# Patient Record
Sex: MALE | NOT HISPANIC OR LATINO | Employment: FULL TIME | URBAN - METROPOLITAN AREA
[De-identification: names, ages, dates, MRNs, and addresses within clinical notes are randomized per-mention and may not be internally consistent; named-entity substitution may affect disease eponyms.]

---

## 2024-05-17 ENCOUNTER — TELEPHONE (OUTPATIENT)
Dept: PAIN MEDICINE | Facility: CLINIC | Age: 69
End: 2024-05-17
Payer: COMMERCIAL

## 2024-05-17 NOTE — TELEPHONE ENCOUNTER
----- Message from Cornelia Jamil RN sent at 5/17/2024  3:01 PM CDT -----  Contact: Dannie    ----- Message -----  From: Judy Randolph  Sent: 5/7/2024   4:03 PM CDT  To: [facility] S[facility] Staff Pain Mgmt    Pt is calling in regards to est care and also referral from [facility] was sent over. Dannie can be reached at [phone].        Thanks  [facility]

## 2024-05-17 NOTE — TELEPHONE ENCOUNTER
Spoke with wife, referral and demographic from [facility] pt received. Pt is scheduled to have MRI soon, wife will obtain disc and imaging report for Dr Call to review prior to scheduling.

## 2024-07-02 ENCOUNTER — TELEPHONE (OUTPATIENT)
Dept: PAIN MEDICINE | Facility: CLINIC | Age: 69
End: 2024-07-02
Payer: COMMERCIAL

## 2024-07-02 NOTE — TELEPHONE ENCOUNTER
----- Message from Judy Randolph sent at 7/2/2024 11:15 AM CDT -----  Pt wife nAia is calling in regards to getting ECA and if referral is needed. Ania an be reached at [phone].          Thanks  DARIUS

## 2024-07-02 NOTE — TELEPHONE ENCOUNTER
I spoke with Ania (Mr Bowman wife) to let her know we still haven't received a referral advised to contact PCP to request that they resend referral to our office, understanding verbalized.

## 2024-07-16 DIAGNOSIS — M25.551 RIGHT HIP PAIN: Primary | ICD-10-CM

## 2024-07-19 ENCOUNTER — OFFICE VISIT (OUTPATIENT)
Dept: PAIN MEDICINE | Facility: CLINIC | Age: 69
End: 2024-07-19
Payer: COMMERCIAL

## 2024-07-19 VITALS
BODY MASS INDEX: 27.83 KG/M2 | HEIGHT: 66 IN | SYSTOLIC BLOOD PRESSURE: 144 MMHG | WEIGHT: 173.19 LBS | HEART RATE: 61 BPM | OXYGEN SATURATION: 95 % | DIASTOLIC BLOOD PRESSURE: 81 MMHG

## 2024-07-19 DIAGNOSIS — R29.2 BRISK DEEP TENDON REFLEXES: ICD-10-CM

## 2024-07-19 DIAGNOSIS — M43.12 SPONDYLOLISTHESIS OF CERVICAL REGION: ICD-10-CM

## 2024-07-19 DIAGNOSIS — M54.2 CERVICALGIA: ICD-10-CM

## 2024-07-19 DIAGNOSIS — M70.61 GREATER TROCHANTERIC BURSITIS OF RIGHT HIP: ICD-10-CM

## 2024-07-19 DIAGNOSIS — F41.8 SITUATIONAL ANXIETY: ICD-10-CM

## 2024-07-19 DIAGNOSIS — R29.2: ICD-10-CM

## 2024-07-19 DIAGNOSIS — M25.551 RIGHT HIP PAIN: Primary | ICD-10-CM

## 2024-07-19 DIAGNOSIS — M47.812 CERVICAL SPONDYLOSIS: ICD-10-CM

## 2024-07-19 DIAGNOSIS — M25.551 PAIN OF RIGHT HIP: ICD-10-CM

## 2024-07-19 DIAGNOSIS — M48.02 SPINAL STENOSIS, CERVICAL REGION: ICD-10-CM

## 2024-07-19 DIAGNOSIS — F10.21 HISTORY OF ALCOHOLISM: ICD-10-CM

## 2024-07-19 PROCEDURE — 20611 DRAIN/INJ JOINT/BURSA W/US: CPT | Mod: RT,S$GLB,, | Performed by: PHYSICAL MEDICINE & REHABILITATION

## 2024-07-19 PROCEDURE — 4010F ACE/ARB THERAPY RXD/TAKEN: CPT | Mod: CPTII,S$GLB,, | Performed by: PHYSICAL MEDICINE & REHABILITATION

## 2024-07-19 PROCEDURE — 1159F MED LIST DOCD IN RCRD: CPT | Mod: CPTII,S$GLB,, | Performed by: PHYSICAL MEDICINE & REHABILITATION

## 2024-07-19 PROCEDURE — 99204 OFFICE O/P NEW MOD 45 MIN: CPT | Mod: 25,S$GLB,, | Performed by: PHYSICAL MEDICINE & REHABILITATION

## 2024-07-19 PROCEDURE — 3008F BODY MASS INDEX DOCD: CPT | Mod: CPTII,S$GLB,, | Performed by: PHYSICAL MEDICINE & REHABILITATION

## 2024-07-19 PROCEDURE — 3079F DIAST BP 80-89 MM HG: CPT | Mod: CPTII,S$GLB,, | Performed by: PHYSICAL MEDICINE & REHABILITATION

## 2024-07-19 PROCEDURE — 3077F SYST BP >= 140 MM HG: CPT | Mod: CPTII,S$GLB,, | Performed by: PHYSICAL MEDICINE & REHABILITATION

## 2024-07-19 PROCEDURE — 1160F RVW MEDS BY RX/DR IN RCRD: CPT | Mod: CPTII,S$GLB,, | Performed by: PHYSICAL MEDICINE & REHABILITATION

## 2024-07-19 PROCEDURE — 1125F AMNT PAIN NOTED PAIN PRSNT: CPT | Mod: CPTII,S$GLB,, | Performed by: PHYSICAL MEDICINE & REHABILITATION

## 2024-07-19 RX ORDER — SODIUM, POTASSIUM,MAG SULFATES 17.5-3.13G
SOLUTION, RECONSTITUTED, ORAL ORAL
COMMUNITY
Start: 2024-07-09

## 2024-07-19 RX ORDER — NEBIVOLOL 10 MG/1
10 TABLET ORAL
COMMUNITY
Start: 2024-05-18

## 2024-07-19 RX ORDER — IRBESARTAN 300 MG/1
300 TABLET ORAL
COMMUNITY
Start: 2024-06-07

## 2024-07-19 RX ORDER — TADALAFIL 20 MG/1
TABLET ORAL
COMMUNITY
Start: 2024-07-10

## 2024-07-19 RX ORDER — TAMSULOSIN HYDROCHLORIDE 0.4 MG/1
0.4 CAPSULE ORAL
COMMUNITY
Start: 2024-04-22

## 2024-07-19 RX ORDER — TRIAMCINOLONE ACETONIDE 40 MG/ML
40 INJECTION, SUSPENSION INTRA-ARTICULAR; INTRAMUSCULAR
Status: DISCONTINUED | OUTPATIENT
Start: 2024-07-19 | End: 2024-07-19 | Stop reason: HOSPADM

## 2024-07-19 RX ORDER — FINASTERIDE 5 MG/1
5 TABLET, FILM COATED ORAL
COMMUNITY
Start: 2024-06-07

## 2024-07-19 RX ORDER — HYDROCHLOROTHIAZIDE 12.5 MG/1
12.5 TABLET ORAL
COMMUNITY
Start: 2024-05-21

## 2024-07-19 RX ORDER — LIDOCAINE HYDROCHLORIDE 10 MG/ML
3 INJECTION INFILTRATION; PERINEURAL
Status: DISCONTINUED | OUTPATIENT
Start: 2024-07-19 | End: 2024-07-19 | Stop reason: HOSPADM

## 2024-07-19 RX ORDER — ROSUVASTATIN CALCIUM 10 MG/1
10 TABLET, COATED ORAL
COMMUNITY
Start: 2024-06-07

## 2024-07-19 RX ADMIN — LIDOCAINE HYDROCHLORIDE 3 ML: 10 INJECTION INFILTRATION; PERINEURAL at 01:07

## 2024-07-19 RX ADMIN — TRIAMCINOLONE ACETONIDE 40 MG: 40 INJECTION, SUSPENSION INTRA-ARTICULAR; INTRAMUSCULAR at 01:07

## 2024-07-19 NOTE — PROCEDURES
Large Joint Aspiration/Injection: R greater trochanteric bursa    Date/Time: 7/19/2024 1:00 PM    Performed by: Daphne Call MD  Authorized by: Daphne Call MD    Consent Done?:  Yes (Written)  Indications:  Pain  Site marked: the procedure site was marked    Timeout: prior to procedure the correct patient, procedure, and site was verified    Prep: patient was prepped and draped in usual sterile fashion    Local anesthesia used?: No      Details:  Needle Size:  22 G  Ultrasonic Guidance for needle placement?: Yes    Images are saved and documented.  Approach:  Lateral  Location:  Hip  Site:  R greater trochanteric bursa  Medications:  40 mg triamcinolone acetonide 40 mg/mL; 3 mL LIDOcaine HCL 10 mg/ml (1%) 10 mg/mL (1 %)  Patient tolerance:  Patient tolerated the procedure well with no immediate complications    
MST Score 2 or Greater

## 2024-07-19 NOTE — PROGRESS NOTES
[facility]      Referring Provider: Eleanor Roldan Md  [address]   [address]  [facility] [place] [address]    Chief Complaint:   Chief Complaint   Patient presents with    Hip Pain     right       History of Present Illness: Dannie Bowman is a 68 y.o. male referred by Dr. Eleanor Roldan for right hip pain.      Onset: 1 year, insidious  Location: right lateral hip  Radiation: right lateral thigh to the knee  Timing: intermittent  Quality: Aching, Tight, Deep, and Sharp  Exacerbating Factors: walking, standing, and lifting  Alleviating Factors: lying down, sitting, repositioning , and medications  Associated Symptoms: He feels weakness in the right leg. He gets occasional numbness in the leg if sitting for a long time. He has a history of skin cancer. Pain will wake him at night. He denies night fever/night sweats, urinary incontinence/change in function, bowel incontinence/change in function, and unexplained weight loss    Patient has failed > 6 weeks of conservative treatment including: Activity modification (avoiding exacerbating factors), physical therapy, and oral medications. He is using a quad cane. He has tried ibuprofen. He has tried CBD oil.     Severity: Currently: 3/10   Typical Range: 2-8/10     Exacerbation: 8/10     Functional Limitations: Moderate to severe pain is limiting Sleep, Work, and Recreation.    Employment Status: Professor of Math at [employer]    P = 4  E = 3  G = 5  Baseline PEG Score = 4  Current PEG Score: 4    Opioid Risk Score         Value Time User    Opioid Risk Score  0 7/19/2024  1:15 PM Zena Chapa LPN             Previous Interventions:  -     Previous Therapies:  PT/OT: yes   Relevant Surgery: no   Previous Medications:   - NSAIDS: ibuprofen  - Muscle Relaxants:    - TCAs:   - SNRIs:   - Topicals: CBD oil  - Anticonvulsants:    - Opioids:     Current Pain Medications:  Ibuprofen     Blood Thinners: None    Full Medication  "List:    Current Outpatient Medications:     finasteride (PROSCAR) 5 mg tablet, 5 mg., Disp: , Rfl:     hydroCHLOROthiazide (HYDRODIURIL) 12.5 MG Tab, Take 12.5 mg by mouth., Disp: , Rfl:     irbesartan (AVAPRO) 300 MG tablet, Take 300 mg by mouth., Disp: , Rfl:     nebivoloL (BYSTOLIC) 10 MG Tab, 10 mg., Disp: , Rfl:     rosuvastatin (CRESTOR) 10 MG tablet, Take 10 mg by mouth., Disp: , Rfl:     sodium,potassium,mag sulfates (SUPREP BOWEL PREP KIT) 17.5-3.13-1.6 gram SolR, TAKE 6 ML BY MOUTH AS DIRECTED FOLLOW MD INSTRUCTIONS, Disp: , Rfl:     tadalafiL (CIALIS) 20 MG Tab, TAKE 1 TABLET BY MOUTH AS DIRECTED (NOT COVERED), Disp: , Rfl:     tamsulosin (FLOMAX) 0.4 mg Cap, Take 0.4 mg by mouth., Disp: , Rfl:      Review of Systems: See HPI    Allergies:  Patient has no known allergies.     Medical History:   has a past medical history of Hyperlipidemia, Hypertension, Impotence of organic origin, Joint pain, and Unspecified sensorineural hearing loss.    Surgical History:   has a past surgical history that includes Hernia repair (Bilateral) and Bone graft (Right).    Family History:  family history includes Cancer in his father and mother; Diabetes in his father and mother; Hypertension in his father and mother.    Social History:   reports that he has quit smoking. His smoking use included cigarettes. He has never used smokeless tobacco. He reports that he does not drink alcohol and does not use drugs.    Physical Exam:  BP (!) 144/81   Pulse 61   Ht 5' 5.98" (1.676 m)   Wt 78.6 kg (173 lb 3.1 oz)   SpO2 95%   BMI 27.97 kg/m²   GEN: No acute distress. Calm, comfortable  HENT: Normocephalic, atraumatic, moist mucous membranes  EYE: Anicteric sclera, non-injected.   CV: Non-diaphoretic. Regular Rate. Radial Pulses 2+.  RESP: Breathing comfortably. Chest expansion symmetric.  EXT: No clubbing, cyanosis. Peripheral edema in legs/feet  SKIN: Warm, & dry to palpation. No visible rashes or lesions of exposed skin. "   PSYCH: Pleasant mood and appropriate affect. Recent and remote memory intact.   GAIT: Mod Independent w/ quad cane, antalgic ambulation not bearing weight on the right  Lumbar Spine Exam:       Inspection: No erythema, bruising.       Palpation:   - (-) TTP of lumbar paraspinals bilaterally.  - (-) TTP of sacroiliac joint bilaterally.     ROM: (+) Limited in flexion. (-) limited in extension, (-) limitation in lateral bending bilateral.    Directional Preference: None      Provocative Maneuvers:  (-) Facet loading bilaterally  (+) Straight Leg Raise on the right   (+) SHANTE on the right w/ pain on lateral hip primarily  Hip Exam:      Inspection: No gross deformity or apparent leg length discrepancy      Palpation: (+) TTP to greater trochanteric bursa(s) on right.       ROM: (slight) limitation in internal rotation b/l, limitation in external rotation bilateral  Neurologic Exam:     Alert. Speech is fluent and appropriate.     Strength:  4/5 in b/l hip flexion, otherwise 5/5 throughout bilateral lower extremities     Sensation:  Grossly intact to light touch in bilateral lower extremities     Reflexes: 3+ in b/l patella, achilles     Tone: No abnormality appreciated in bilateral lower extremities     No Clonus     Downgoing toes on plantar stimulation     (+) Andrade bilaterally                Imaging:  - X-ray cervical spine 7/19/24:  Vertebral body heights maintained. Trace retrolisthesis C4 on C5. Multilevel degenerative disc height loss of the mid and lower cervical spine with osteophyte formation most prevalent at C3-4 and C4-5. Multilevel facet arthropathy present. No acute abnormality. Soft tissues unremarkable.     - X-ray lumbar spine 7/19/24:  Vertebral body heights maintained. No spondylolisthesis. L5-S1 degenerative disc height loss. Multilevel facet arthropathy. No acute osseous abnormality. Constipation.     - X-ray hips b/l 7/19/24:  No acute osseous abnormality. Hip joint spaces maintained.  "Degenerative findings of the lower lumbar spine noted. Soft tissues unremarkable.         Labs:  BMP  No results found for: "NA", "K", "CL", "CO2", "BUN", "CREATININE", "CALCIUM", "ANIONGAP", "EGFRNORACEVR"  No results found for: "ALT", "AST", "GGT", "ALKPHOS", "BILITOT"  No results found for: "PLT"    Assessment:  Dannie Bowman is a 68 y.o. male with the following diagnoses based on history, exam, and imaging:    Problem List Items Addressed This Visit    None  Visit Diagnoses       Right hip pain    -  Primary    Relevant Orders    X-Ray Hips Bilateral 2 View Incl AP Pelvis (Completed)    MRI Hip Without Contrast Right    X-Ray Lumbar Spine AP And Lateral (Completed)    Large Joint Aspiration/Injection: R greater trochanteric bursa    Greater trochanteric bursitis of right hip        Relevant Orders    X-Ray Hips Bilateral 2 View Incl AP Pelvis (Completed)    Large Joint Aspiration/Injection: R greater trochanteric bursa    Cervicalgia        Relevant Orders    MRI Cervical Spine Without Contrast    X-Ray Cervical Spine AP And Lateral (Completed)    Spinal stenosis, cervical region        Relevant Orders    MRI Cervical Spine Without Contrast    X-Ray Cervical Spine AP And Lateral (Completed)    Andrade's reflex positive        Relevant Orders    MRI Cervical Spine Without Contrast    X-Ray Cervical Spine AP And Lateral (Completed)    Brisk deep tendon reflexes        Relevant Orders    MRI Cervical Spine Without Contrast    X-Ray Cervical Spine AP And Lateral (Completed)    Situational anxiety        History of alcoholism        Relevant Orders    X-Ray Hips Bilateral 2 View Incl AP Pelvis (Completed)    MRI Hip Without Contrast Right    Pain of right hip        Relevant Orders    MRI Hip Without Contrast Right    Cervical spondylosis        Spondylolisthesis of cervical region                This is a pleasant 68 y.o. gentleman presenting with:     - Chronic right hip pain radiating down to the right knee: " Suspect primarily due to GTBursitis and having some ITB syndrome with weakness in pelvis/hips   - Minimal findings on hip x-ray, outside of degenerative changes of spine.   - He does have h/o heavy EtOH, and potentially this is AVN of the hip  - Patient does have radiating pain down the right leg and (+) SLR on the right indicating possible radiculopathy  - Neck pain: (+) bridges, brisk reflexes on exam.   - Comorbidities: HTN. H/o     Treatment Plan:   - PT/OT/HEP: Cont HEP. Discussed benefits of exercise for pain.   - Procedures: Performed right GTB CSI w/ US guidance   - If limited relief, plan for likely lumbar TF JOEL pending advanced imaging.   - Medications: No changes recommended at this time.  - Imaging: Reviewed.   - X-ray hips and c-spine.   - MRI right hip as failed conservative measures and h/o heavy EtOH but minimal findings on x-ray and would like to r/o AVN of the hip  - MRI C-spine to assess neck pain and UMN signs on exam.   - Plan for lumbar MRI if hip MRI not revealing.   - Labs: None.      Follow Up: RTC after imaging.       Daphne Call MD  Interventional Pain Medicine

## 2024-07-30 ENCOUNTER — TELEPHONE (OUTPATIENT)
Dept: PAIN MEDICINE | Facility: CLINIC | Age: 69
End: 2024-07-30
Payer: COMMERCIAL

## 2024-07-30 DIAGNOSIS — F41.8 SITUATIONAL ANXIETY: Primary | ICD-10-CM

## 2024-07-30 RX ORDER — DIAZEPAM 5 MG/1
5 TABLET ORAL ONCE
Qty: 2 TABLET | Refills: 0 | Status: SHIPPED | OUTPATIENT
Start: 2024-07-30 | End: 2024-07-30

## 2024-07-30 NOTE — TELEPHONE ENCOUNTER
----- Message from Augusta Morales sent at 7/30/2024 12:38 PM CDT -----  Contact: pt wife Ania  Pt wife Ania calling to state pt is scheduled for  mri for 8/12 and she can be reached at. [phone].    Thanks,

## 2024-07-30 NOTE — TELEPHONE ENCOUNTER
Spoke with pt, he is in the process of scheduling the MRI's that Dr Call ordered.     Pt would like to have the valium sent to [facility] on [address] due to claustrophobia, to take prior to MRI. Pt states that he spoke with Dr Call about this at last visit.

## 2024-08-12 DIAGNOSIS — M54.16 LUMBAR RADICULOPATHY, CHRONIC: Primary | ICD-10-CM

## 2024-08-12 DIAGNOSIS — F41.8 SITUATIONAL ANXIETY: ICD-10-CM

## 2024-08-13 DIAGNOSIS — R29.2: ICD-10-CM

## 2024-08-13 DIAGNOSIS — R29.2 BRISK DEEP TENDON REFLEXES: ICD-10-CM

## 2024-08-13 DIAGNOSIS — M48.02 DEGENERATIVE CERVICAL SPINAL STENOSIS: Primary | ICD-10-CM

## 2024-08-13 NOTE — PROGRESS NOTES
Imaging reviewed.  The hips looks okay and no signs of avascular necrosis. Will order lumbar MRI to assess radiculopathy, leg weakness.    Please pull images from [facility] into Epic.

## 2024-08-14 ENCOUNTER — TELEPHONE (OUTPATIENT)
Dept: PAIN MEDICINE | Facility: CLINIC | Age: 69
End: 2024-08-14

## 2024-08-14 ENCOUNTER — OFFICE VISIT (OUTPATIENT)
Dept: PAIN MEDICINE | Facility: CLINIC | Age: 69
End: 2024-08-14
Payer: COMMERCIAL

## 2024-08-14 VITALS
HEIGHT: 66 IN | BODY MASS INDEX: 27.85 KG/M2 | SYSTOLIC BLOOD PRESSURE: 146 MMHG | HEART RATE: 78 BPM | WEIGHT: 173.31 LBS | DIASTOLIC BLOOD PRESSURE: 51 MMHG

## 2024-08-14 DIAGNOSIS — M48.02 DEGENERATIVE CERVICAL SPINAL STENOSIS: ICD-10-CM

## 2024-08-14 DIAGNOSIS — M54.16 LUMBAR RADICULOPATHY: ICD-10-CM

## 2024-08-14 DIAGNOSIS — F41.8 SITUATIONAL ANXIETY: ICD-10-CM

## 2024-08-14 DIAGNOSIS — R29.2: ICD-10-CM

## 2024-08-14 DIAGNOSIS — M48.02 SPINAL STENOSIS, CERVICAL REGION: Primary | ICD-10-CM

## 2024-08-14 DIAGNOSIS — R29.898 WEAKNESS OF RIGHT LOWER EXTREMITY: ICD-10-CM

## 2024-08-14 DIAGNOSIS — M25.551 RIGHT HIP PAIN: ICD-10-CM

## 2024-08-14 RX ORDER — DIAZEPAM 5 MG/1
5 TABLET ORAL ONCE
Qty: 1 TABLET | Refills: 0 | Status: SHIPPED | OUTPATIENT
Start: 2024-08-14 | End: 2024-08-14

## 2024-08-14 NOTE — TELEPHONE ENCOUNTER
----- Message from Daphne Call MD sent at 8/13/2024  8:51 AM CDT -----  Imaging reviewed.  There are arthritic changes which create severe canal stenosis at multiple levels, appears worst at C5-6, and this is causing inflammation within the spinal cord.     I am sending an urgent referral to neurosurgery for potential decompression.    Please pull images from [facility] into Epic.

## 2024-08-14 NOTE — PROGRESS NOTES
[facility]          Chief Complaint:   Chief Complaint   Patient presents with    Hip Pain     right       History of Present Illness: Dannie Bowman is a 68 y.o. male referred by Dr. Eleanor Roldan for right hip pain.      Onset: 1 year, insidious  Location: right lateral hip  Radiation: right lateral thigh to the knee  Timing: intermittent  Quality: Aching, Tight, Deep, and Sharp  Exacerbating Factors: walking, standing, and lifting  Alleviating Factors: lying down, sitting, repositioning , and medications  Associated Symptoms: He feels weakness in the right leg. He gets occasional numbness in the leg if sitting for a long time. He has a history of skin cancer. Pain will wake him at night. He denies night fever/night sweats, urinary incontinence/change in function, bowel incontinence/change in function, and unexplained weight loss    Patient has failed > 6 weeks of conservative treatment including: Activity modification (avoiding exacerbating factors), physical therapy, and oral medications. He is using a quad cane. He has tried ibuprofen. He has tried CBD oil.     Severity: Currently: 3/10   Typical Range: 2-8/10     Exacerbation: 8/10     Functional Limitations: Moderate to severe pain is limiting Sleep, Work, and Recreation.    Employment Status: Professor of Math at [employer]    P = 4  E = 3  G = 5  Baseline PEG Score = 4    Interval History (08/14/2024):  Dannie Bowman returns today for follow up.  At the last clinic visit, continue HEP, Performed right GTB CSI w/ US guidance, Ordered x-ray of  hip & C-spine. Ordered MRI of right hip and C-spine given UMN signs on exam.     Right GTB CSI w/ US guidance provided 0% relief.    Currently, the hip pain is stable. Continues to have pain to lateral hip, into lateral thigh and into knee. Denies new weakness or numbness.     He did have MRI of cervical spine, which revealed severe canal stenosis, worst at C5-6. He denies neck pain, but does report dropping  items repetitively, difficulty with buttons and changes in gait.  Denies changes in bowel or bladder function. Denies recent fevers or infections. Denies unexplained weight loss. He is scheduled to see Dr. Leone tomorrow for surgical evaluation of neck..       Current Pain Scales:  Current: 4/10              Typical Range: 3-8/10     Current PEG Score: 6.33    Opioid Risk Score         Value Time User    Opioid Risk Score  8 8/14/2024  2:37 PM Stanley Gallo MA             Previous Interventions:  - 7/19/24: Right GTB w/ US guidance provided 0% relief.    Previous Therapies:  PT/OT: yes   Relevant Surgery: no   Previous Medications:   - NSAIDS: ibuprofen  - Muscle Relaxants:    - TCAs:   - SNRIs:   - Topicals: CBD oil  - Anticonvulsants:    - Opioids:     Current Pain Medications:  Ibuprofen     Blood Thinners: None    Full Medication List:    Current Outpatient Medications:     finasteride (PROSCAR) 5 mg tablet, 5 mg., Disp: , Rfl:     hydroCHLOROthiazide (HYDRODIURIL) 12.5 MG Tab, Take 12.5 mg by mouth., Disp: , Rfl:     irbesartan (AVAPRO) 300 MG tablet, Take 300 mg by mouth., Disp: , Rfl:     nebivoloL (BYSTOLIC) 10 MG Tab, 10 mg., Disp: , Rfl:     rosuvastatin (CRESTOR) 10 MG tablet, Take 10 mg by mouth., Disp: , Rfl:     tadalafiL (CIALIS) 20 MG Tab, TAKE 1 TABLET BY MOUTH AS DIRECTED (NOT COVERED), Disp: , Rfl:     tamsulosin (FLOMAX) 0.4 mg Cap, Take 0.4 mg by mouth., Disp: , Rfl:     diazePAM (VALIUM) 5 MG tablet, Take 1 tablet (5 mg total) by mouth once. 45 minutes to 1 hour prior to MRI for procedural anxiety for 1 dose for 1 dose, Disp: 1 tablet, Rfl: 0     Review of Systems: See HPI    Allergies:  Patient has no known allergies.     Medical History:   has a past medical history of ADHD (attention deficit hyperactivity disorder), History of alcohol abuse, Hyperlipidemia, Hypertension, Impotence of organic origin, Joint pain, and Unspecified sensorineural hearing loss.    Surgical History:   has a past  "surgical history that includes Hernia repair (Bilateral) and Bone graft (Right).    Family History:  family history includes Alcohol abuse in his mother and sister; Cancer in his father and mother; Diabetes in his father and mother; Hypertension in his father and mother.    Social History:   reports that he has quit smoking. His smoking use included cigarettes. He has never used smokeless tobacco. He reports that he does not drink alcohol and does not use drugs.    Physical Exam:  BP (!) 146/51   Pulse 78   Ht 5' 5.9" (1.674 m)   Wt 78.6 kg (173 lb 4.5 oz)   BMI 28.05 kg/m²   GEN: No acute distress. Calm, comfortable  HENT: Normocephalic, atraumatic, moist mucous membranes  EYE: Anicteric sclera, non-injected.   CV: Non-diaphoretic. Regular Rate. Radial Pulses 2+.  RESP: Breathing comfortably. Chest expansion symmetric.  EXT: No clubbing, cyanosis. Peripheral edema in legs/feet  SKIN: Warm, & dry to palpation. No visible rashes or lesions of exposed skin.   PSYCH: Pleasant mood and appropriate affect. Recent and remote memory intact.   GAIT: Mod Independent w/ quad cane, antalgic ambulation not bearing weight on the right  Lumbar Spine Exam:       Inspection: No erythema, bruising.       Palpation:   - (-) TTP of lumbar paraspinals bilaterally.  - (-) TTP of sacroiliac joint bilaterally.     ROM: (+) Limited in flexion. (-) limited in extension, (-) limitation in lateral bending bilateral.    Directional Preference: None      Provocative Maneuvers:  (-) Facet loading bilaterally  (+) Straight Leg Raise on the right   (+) SHANTE on the right w/ pain on lateral hip primarily  Hip Exam:      Inspection: No gross deformity or apparent leg length discrepancy      Palpation: (+) TTP to greater trochanteric bursa(s) on right.       ROM: (slight) limitation in internal rotation b/l, limitation in external rotation bilateral  Neurologic Exam:     Alert. Speech is fluent and appropriate.     Strength:  4/5 in b/l hip " flexion, otherwise 5/5 throughout bilateral lower extremities     Sensation:  Grossly intact to light touch in bilateral lower extremities     Reflexes: 3+ in b/l patella, achilles     Tone: No abnormality appreciated in bilateral lower extremities     No Clonus     Downgoing toes on plantar stimulation     (+) Andrade bilaterally                Imaging:  - MRI Cervical spine 8/12/24:  FINDINGS:   The visualized anatomy at the skull base is normal. There is no evidence of any cerebellar tonsillar ectopia.        No prevertebral edema is demonstrated. There is no evidence of any edema within the interspinous ligaments to suggest any strain or sprain of those ligaments.        There is abnormal signal in the cervical spinal cord C5-C6 disc level and extending inferiorly to the upper C6-C7 disc level, most likely related to gliosis there is a significant spinal stenosis at this level.        There is preservation of cervical vertebral body stature. No edema is noted  within the cervical vertebral bodies. There is  preservation of a normal cervical lordosis.        Degenerative changes of the cervical spine are as follows:        C2/C3:  Disc desiccation. Minimal anterior spondylosis. Broad-based posterior annular bulge with ligamentum flavum hypertrophy. Right posterior  facet arthropathy. Stenosis.        C3/C4:  Disc desiccation. There is an anterior outer annular fissure. Moderate disc space narrowing. Anterior and posterior spondylosis with bilateral uncinate hypertrophy and with a right uncovertebral osteophyte disc complex and probable posterior disc herniation. Moderate right and mild left posterior facet arthropathy. Ligamentum flavum and mild posterior facet hypertrophy. Spinal stenosis. Right foraminal origin stenosis.        C4/C5:  Disc desiccation with prominent disc space narrowing and mild retrolisthesis of C4 on C5. There is a posterior outer annular fissure. Anterior and posterior spondylosis with  bilateral uncinate hypertrophy and with bilateral posterior facet and ligamentum flavum hypertrophy. There are  anterior and posterior osteophyte disc complexes. There is moderate to prominent spinal stenosis with bony bilateral foraminal stenosis, predominating on the right.        C5/C6:  Disc desiccation with prominent disc space narrowing. Bilateral uncinate hypertrophy with broad-based, posterior osteophyte disc complex. There is a left anterior outer annular fissure. Left posterior inner annular fissures are seen. There is bilateral posterior facet and ligamentum flavum  hypertrophy. Severe spinal stenosis with bilateral lateral recess and foraminal origin stenosis.        C6/C7:  Disc desiccation with moderate disc space narrowing. Anterior posterior spondylosis. Mild bilateral posterior facet arthropathy. There is  bilateral uncinate hypertrophy, left side greater than right. Broad-based posterior osteophyte disc complex. Prominent spinal stenosis with bilateral    lateral recess and foraminal stenosis, predominating on the left.        C7/T1:  Mild disc desiccation and mild disc space narrowing. Bilateral posterior facet hypertrophy. No stenosis.        - MRI Right hip 8/12/24:   No evidence of fracture or osteonecrosis. No suspicious marrow edema. No joint effusion. There is low-grade superior cartilage thinning right hip. Suspected nondisplaced tear of the anterosuperior labrum.        No soft tissue mass or collection. Mild distal right gluteal tendinopathy without evidence of high-grade tear. No evidence of iliopsoas or trochanteric bursitis.      - X-ray cervical spine 7/19/24:  Vertebral body heights maintained. Trace retrolisthesis C4 on C5. Multilevel degenerative disc height loss of the mid and lower cervical spine with osteophyte formation most prevalent at C3-4 and C4-5. Multilevel facet arthropathy present. No acute abnormality. Soft tissues unremarkable.     - X-ray lumbar spine  "7/19/24:  Vertebral body heights maintained. No spondylolisthesis. L5-S1 degenerative disc height loss. Multilevel facet arthropathy. No acute osseous abnormality. Constipation.     - X-ray hips b/l 7/19/24:  No acute osseous abnormality. Hip joint spaces maintained. Degenerative findings of the lower lumbar spine noted. Soft tissues unremarkable.         Labs:  BMP  No results found for: "NA", "K", "CL", "CO2", "BUN", "CREATININE", "CALCIUM", "ANIONGAP", "EGFRNORACEVR"  No results found for: "ALT", "AST", "GGT", "ALKPHOS", "BILITOT"  No results found for: "PLT"    Assessment:  Dannie Bowman is a 68 y.o. male with the following diagnoses based on history, exam, and imaging:    Problem List Items Addressed This Visit    None  Visit Diagnoses       Spinal stenosis, cervical region    -  Primary    Degenerative cervical spinal stenosis        Andrade's reflex positive        Lumbar radiculopathy        Weakness of right lower extremity        Right hip pain        Situational anxiety        Relevant Medications    diazePAM (VALIUM) 5 MG tablet              This is a pleasant 68 y.o. gentleman presenting with:     - Chronic right hip pain radiating down to the right knee: Suspect primarily due to GTBursitis and having some ITB syndrome with weakness in pelvis/hips   - Minimal findings on hip x-ray, outside of degenerative changes of spine.   - MRI w/ mild distal right gluteal tendinopathy and nondisplaced tear of anterosuperior labrum, no findings of AVN.  - Patient does have radiating pain down the right leg and (+) SLR on the right indicating possible radiculopathy causing referred pain and weakness.  - Neck pain: (+) andrade, brisk reflexes on exam.   - MRI with arthritic changes which creates severe canal stenosis at multiple levels, most severe at C5-6 with gliosis at this level.    - Pending evaluation by NSGY  - Comorbidities: HTN. H/o EtOH abuse.     Treatment Plan:   - PT/OT/HEP: Cont HEP. Discussed benefits of " exercise for pain.   - Procedures: Plan for likely lumbar TF JOEL pending advanced imaging.   - Medications:    - Rx for Valium 5mg (single dose) prior to Lumbar MRI.  - Imaging: Reviewed.  - Ordered lumbar MRI as no relief with conservative measures, pending.  - Labs: None. Request labs from PCP (CBC, CMP, A1c)  - Continue follow up with NSGY as scheduled for cervical stenosis.      Follow Up: RTC after lumbar MRI or sooner PRN.    I spent a total of 45 minutes on the day of the visit. This includes face to face time and non-face to face time preparing to see the patient (eg, review of tests), obtaining and/or reviewing separately obtained history, documenting clinical information in the electronic or other health record, independently interpreting results and communicating results to the patient/family/caregiver, or care coordinator.    Sumi Kurtz PA-C  Interventional Pain Medicine

## 2024-08-14 NOTE — TELEPHONE ENCOUNTER
Spoke with wife in regards to pt's appt on this afternoon, wife will not be available to assist the pt to today's appt, but notes that the pt will be here on time.

## 2024-09-05 ENCOUNTER — TELEPHONE (OUTPATIENT)
Dept: PAIN MEDICINE | Facility: CLINIC | Age: 69
End: 2024-09-05
Payer: COMMERCIAL

## 2024-09-05 NOTE — TELEPHONE ENCOUNTER
----- Message from Crystal Meza sent at 9/5/2024 12:41 PM CDT -----  States he needs to get an order, for an MRI lower spinal canal w/out contrast, [facility] [address] [phone]. Auth# [id number]. Please call Ania Bowman [phone]. Thank you

## 2024-09-25 DIAGNOSIS — M54.16 LUMBAR RADICULOPATHY, CHRONIC: Primary | ICD-10-CM

## 2024-09-26 NOTE — PROGRESS NOTES
Imaging reviewed.  There are arthritic changes which are most significant at the L4-5 level, and this creates moderate canal stenosis and lateral recess stenosis with moderate to severe bilateral foraminal narrowing.  There is an effusion of the right L4-5 facet with posteriorly extending synovial cyst.    If pain is persisting despite conservative measures, I would plan for right L4 & L5 TF JOEL.     Please pull images from [facility] into Epic.

## 2024-10-09 ENCOUNTER — OFFICE VISIT (OUTPATIENT)
Dept: PAIN MEDICINE | Facility: CLINIC | Age: 69
End: 2024-10-09
Payer: COMMERCIAL

## 2024-10-09 VITALS
HEIGHT: 65 IN | WEIGHT: 173 LBS | BODY MASS INDEX: 28.82 KG/M2 | OXYGEN SATURATION: 97 % | SYSTOLIC BLOOD PRESSURE: 119 MMHG | DIASTOLIC BLOOD PRESSURE: 63 MMHG | HEART RATE: 62 BPM

## 2024-10-09 DIAGNOSIS — R29.898 WEAKNESS OF RIGHT LOWER EXTREMITY: ICD-10-CM

## 2024-10-09 DIAGNOSIS — M54.16 LUMBAR RADICULOPATHY: Primary | ICD-10-CM

## 2024-10-09 DIAGNOSIS — M54.41 CHRONIC RIGHT-SIDED LOW BACK PAIN WITH RIGHT-SIDED SCIATICA: ICD-10-CM

## 2024-10-09 DIAGNOSIS — G89.29 CHRONIC RIGHT-SIDED LOW BACK PAIN WITH RIGHT-SIDED SCIATICA: ICD-10-CM

## 2024-10-09 DIAGNOSIS — M70.61 GREATER TROCHANTERIC BURSITIS OF RIGHT HIP: ICD-10-CM

## 2024-10-09 PROCEDURE — 3008F BODY MASS INDEX DOCD: CPT | Mod: CPTII,,, | Performed by: PHYSICIAN ASSISTANT

## 2024-10-09 PROCEDURE — 99215 OFFICE O/P EST HI 40 MIN: CPT | Mod: S$PBB,,, | Performed by: PHYSICIAN ASSISTANT

## 2024-10-09 PROCEDURE — 1125F AMNT PAIN NOTED PAIN PRSNT: CPT | Mod: CPTII,,, | Performed by: PHYSICIAN ASSISTANT

## 2024-10-09 PROCEDURE — 4010F ACE/ARB THERAPY RXD/TAKEN: CPT | Mod: CPTII,,, | Performed by: PHYSICIAN ASSISTANT

## 2024-10-09 PROCEDURE — 3078F DIAST BP <80 MM HG: CPT | Mod: CPTII,,, | Performed by: PHYSICIAN ASSISTANT

## 2024-10-09 PROCEDURE — 3074F SYST BP LT 130 MM HG: CPT | Mod: CPTII,,, | Performed by: PHYSICIAN ASSISTANT

## 2024-10-09 PROCEDURE — 1159F MED LIST DOCD IN RCRD: CPT | Mod: CPTII,,, | Performed by: PHYSICIAN ASSISTANT

## 2024-10-09 RX ORDER — BACLOFEN 5 MG/1
TABLET ORAL
COMMUNITY
Start: 2024-09-26

## 2024-10-09 RX ORDER — NAPROXEN SODIUM 220 MG/1
81 TABLET, FILM COATED ORAL DAILY
COMMUNITY

## 2024-10-09 NOTE — PATIENT INSTRUCTIONS
Pre-Procedural Instructions  Interventional Pain  Epidural Steroid Injection    Purpose:  We are performing a procedure in which imaging guidance is being utilized to place a needle in a particular location to allow injection of medications into the epidural space in the spine to relieve pain.   It is important to continue therapeutic exercise with physical therapy or a home exercise program as part of your treatment to maintain range of motion and strength of the joint.         Informed Consent:  These procedures are safe when performed by well-trained physicians, but like any interventional procedure, it does carry rare risks which include:  Spinal Cord or nerve injury which may result in weakness, numbness, difficulty breathing, changes in bowel or bladder function  Infection, abscess   Bleeding, hematoma  Dural puncture  Stroke or heart attack  Seizure  Allergic Reactions  Vasovagal reactions  Arachnoiditis  Other potential complications:  No relief or worsening pain  Headache   Steroids are utilized and can result in temporary:  Facial flushing, headache, insomnia/restlessness  Increased blood sugar  Increased blood pressure  Procedural discomfort: This typically improves with ice to the area in 20 minute intervals in the first 1-2 days after the procedure.     Preparing for the procedure:    Tell your healthcare provider about all medicines you take. This includes over-the-counter medicines, herbs, vitamins, and other supplements.  Tell your healthcare provider about any other medical or dental procedures planned in proximity to your procedure.   Reduce Infection Risk:   Notify clinic if you are:  Having signs of illness, such as fevers, or signs of a urinary tract infection (UTI)  Prescribed antibiotics for an infection  Reduce bleeding risk:  For 7 days prior to procedure and during the duration of the trial (until your clinic follow-up):  Stop NSAIDs (ibuprofen/Advil, naproxen/Aleve, Meloxicam/Mobic,  Goody's, or others)  Stop Yesi Nebo, Pepto Bismol, & Herbal Medication/Supplements (such as Ginko, high dose Vitamin E, Fish Oil, Turmeric, Garlic, and others)  Home Support:  You MUST have a responsible  to bring you home from the facility and assist you at home on the day of the procedure   Plan to not be at work on the day of the procedure.   Medications:  Blood thinners: Such as: Aspirin, Plavix, Warfarin (Coumadin), Eliquis, Pradaxa, Xarelto, & others  If you are taking blood thinning medications, the clinic will notify you if you need to hold and of the number days to hold the medication prior to the procedure and when to restart these after the procedure. This will be communicated with and approved by your prescribing physician.   Please notify the office if you are taking blood thinning medications and are unsure if or when you need to hold the medication.   GLP-1 agonists: Semaglutide (Ozempic, Wegovy, Rybelsus), Tirzepatide (Mounjaro, Zepbound), Dulaglutide (Trulicity), Liraglutide, Lixisenatide, Exenatide  These medications can increase the risk of regurgitation and pulmonary aspiration of gastric contents during general anesthesia and deep sedation  If you take this weekly, please hold for 1 week prior to the procedure  If you take this daily, please hold on the day of procedure  If these are utilized for blood sugar control, you will need to discuss with your managing provider on what to utilize for bridging the antidiabetic therapy to maintain blood sugar control and avoiding hyperglycemia  Please take other regular medications as scheduled.  Diet:  If you WILL be receiving sedation for the procedure.  Do NOT eat anything for 8 hours prior to your procedure.    You may drink clear liquids up to 4 hours prior to your procedure.   Clear liquids include: water, black coffee, fruit juice without pulp, clear tea  You may drink water up to 2 hours prior to your procedure  You may use a sip of water  to take your regular medications  If you are NOT receiving sedation for the procedure:  Do not eat or drink anything for 2 hours prior to your procedure. You may eat a light meal more than 2 hours prior to your procedure.   For Diabetic Patients:  Please discuss with your managing physician the best way to take your medications on the procedure day to minimize large increases or decreases in your blood sugar  Please notify clinic of your most recent A1c and when that was performed. Optimizing your blood sugar control prior to the procedure will help decrease your risk of complications, such as infection.   Notify clinic and we will attempt to schedule your procedure as early in the morning as possible to minimize hypoglycemia that may occur while fasting for the procedure.  Please inform clinic if: Dr. Daphne Call's clinic phone number is [phone]  You are pregnant or attempting to become pregnant  You have an implanted electronic device (pacemaker, defibrillator, medication pump, stimulator, etc.)  You are having signs of infection noted above or are started on antibiotics.  You are allergic to iodinated contrast agents, local anesthetics, or steroids.  You are having other medical procedures around the time of your procedure (within 2 weeks)    Instructions for procedure day:    We ask that you please leave your valuables at home  Avoid moisturizers or scented personal products  Wear loose fitting clothing that you can easily change in & out of  Plan to not be at work on the day of the procedure.   Please remove jewelry.  If you are having a procedure done on your head or neck, please remove any metallic items or hardware, such as dental hardware, jewelry that may obscure the images of the area during the procedure.     After the procedure: You will be sent to a recovery room after the procedure. You will go home the same day.     Home Care Instructions:    Injection site(s)  The injection sites may be  covered with a dressing.  You may remove bandage at discharge from the hospital.   Bruising may be present  Avoid soaking or bathing in hot tub, bath or pool on the day of your procedure. Okay to submerge site day after procedure.   Showering is okay the day of procedure.   Avoid heat to the area  Notify the clinic if you are experiencing increased bleeding or drainage from the injection site(s)  Post-procedure pain:  Mild-moderate pain/discomfort may occur  Pain may be relieved with:  Ice pack or bag of frozen peas (or something similar) wrapped in a thin towel to reduce the swelling & pain around incision. Place ice to area for 20 minutes, then remove for 20 minutes and repeat as needed.   OTC Tylenol (Acetaminophen)  Prescription pain medication if needed  Procedural pain typically improves after approximately 1-3 days  Activity/Diet:  Day of the procedure:  Do NOT drive or operate heavy machinery  Avoid strenuous activity, heavy lifting, bending or twisting.   Åvoid activity that requires skill, dexterity or coordination  Avoid independent activities, and have assistance available until normal sensation has returned  If you received sedation - For 24 hrs following the procedure DO NOT:   Drive or operate heavy machinery  Drink alcohol  Make any important decisions  Day after the procedure: Resume daily life activities as tolerated:  Let pain be your guide. If possible, avoid activities that exacerbate your pain  Progress slowly and try not to over exert yourself if you are feeling good  Bed rest is NOT recommended   Physical Therapy (PT): You may restart your home exercise program the day following your procedure.  Diet: You may resume your normal diet as tolerated after the procedure  If nauseated, hold solid foods until nausea has resolved  Medications:  If you held any blood thinner medications for the procedure, you should have been given a specific timeline on when to restart the medication that has been  determined in collaboration with your prescriber and our clinic. If you do not know when to restart, please notify clinic.  You may continue all other regular medications as prescribed.     When to call your healthcare provider [phone]  Call your healthcare provider if you have any of these symptoms:  Fever of 100.4°F (38.0°C) or higher. If you feel hot, take your temperature before notifying clinic.  New pain, weakness, tingling, or numbness in your legs. This may be expected in the first several hours after the procedure due to the local anesthetic used during the procedure.   New or worsening back pain   Redness, drainage or bleeding from site  Changes in bowel (incontinence) function  Changes in bladder (incontinence or retention) function  New or unusual headache &/or neck stiffness  Persistent nausea or vomiting with inability to tolerate clear liquids for more than 12 hours       When to seek medical attention  Call 911 right away if you have any of the following:  Chest pain  Shortness of breath  Signs of a stroke: Sudden changes in vision, changes in speech, sudden weakness in your face/arms/legs  Any other medical emergency     Follow-up: Post-op clinic appointment is typically 2-4 weeks after procedure.      Dr. Daphne Call M.D.  [facility]  Phone: [phone]

## 2024-10-09 NOTE — PROGRESS NOTES
[facility]          Chief Complaint:   Chief Complaint   Patient presents with    Hip Pain     Right       History of Present Illness: Dannie Bowman is a 68 y.o. male referred by Dr. Eleanor Roldan for right hip pain.      Onset: 1 year, insidious  Location: right lateral hip  Radiation: right lateral thigh to the knee  Timing: intermittent  Quality: Aching, Tight, Deep, and Sharp  Exacerbating Factors: walking, standing, and lifting  Alleviating Factors: lying down, sitting, repositioning , and medications  Associated Symptoms: He feels weakness in the right leg. He gets occasional numbness in the leg if sitting for a long time. He has a history of skin cancer. Pain will wake him at night. He denies night fever/night sweats, urinary incontinence/change in function, bowel incontinence/change in function, and unexplained weight loss    Patient has failed > 6 weeks of conservative treatment including: Activity modification (avoiding exacerbating factors), physical therapy, and oral medications. He is using a quad cane. He has tried ibuprofen. He has tried CBD oil.     Severity: Currently: 3/10   Typical Range: 2-8/10     Exacerbation: 8/10     Functional Limitations: Moderate to severe pain is limiting Sleep, Work, and Recreation.    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  At the last clinic visit, continue HEP, Performed right GTB CSI w/ US guidance, Ordered x-ray of  hip & C-spine. Ordered MRI of right hip and C-spine given UMN signs on exam.     Right GTB CSI w/ US guidance provided 0% relief.    Currently, the hip pain is stable. Continues to have pain to lateral hip, into lateral thigh and into knee. Denies new weakness or numbness.     He did have MRI of cervical spine, which revealed severe canal stenosis, worst at C5-6. He denies neck pain, but does report dropping  items repetitively, difficulty with buttons and changes in gait.  Denies changes in bowel or bladder function. Denies recent fevers or infections. Denies unexplained weight loss. He is scheduled to see Dr. Leone tomorrow for surgical evaluation of neck..       Current Pain Scales:  Current: 4/10              Typical Range: 3-8/10     Interval History (10/09/2024):  Dannie Bowman returns today for follow up.  At the last clinic visit, ordered imaging,  Plan for likely lumbar TF JOEL pending advanced imaging.     Currently, the right lower back is stable. Pain continues to be localized to right lower back with radiation into right hip, right lateral thigh, not past the knee. Denies any changes in bowel or bladder function. Denies any new weakness or new numbness since the most recent visit. Denies any fevers or recent infections/antibiotics.     Since prior visit, he underwent cervical decompression (C4-6, partial C7 laminectomy) with Dr. Leone. He is approximately 7 weeks out and is doing well.  He just completed 6 weeks of PT, which provided minimal relief.     Current Pain Scales:  Current: 4/10              Typical Range: 3-7/10   Current PEG Score: 5    Opioid Risk Score         Value Time User    Opioid Risk Score  8 8/14/2024  2:37 PM Stanley Gallo MA             Previous Interventions:  - 7/19/24: Right GTB w/ US guidance provided 0% relief.    Previous Therapies:  PT/OT: yes   Relevant Surgery: C4-6, partial C7 laminectomy 8/19/24   Previous Medications:   - NSAIDS: ibuprofen  - Muscle Relaxants:    - TCAs:   - SNRIs:   - Topicals: CBD oil  - Anticonvulsants:    - Opioids:     Current Pain Medications:  Ibuprofen   Baclofen 5mg    Blood Thinners: None    Full Medication List:    Current Outpatient Medications:     baclofen (LIORESAL) 5 mg Tab tablet, TAKE 1 TABLET BY MOUTH EVERY DAY AT BEDTIME FOR 1 WEEK, 2X/DAY FOR 1 WEEK, THEN 3X/DAY, Disp: , Rfl:     finasteride (PROSCAR) 5 mg tablet, 5 mg., Disp: , Rfl:  "    hydroCHLOROthiazide (HYDRODIURIL) 12.5 MG Tab, Take 12.5 mg by mouth., Disp: , Rfl:     irbesartan (AVAPRO) 300 MG tablet, Take 300 mg by mouth., Disp: , Rfl:     nebivoloL (BYSTOLIC) 10 MG Tab, 10 mg., Disp: , Rfl:     rosuvastatin (CRESTOR) 10 MG tablet, Take 10 mg by mouth., Disp: , Rfl:     tadalafiL (CIALIS) 20 MG Tab, TAKE 1 TABLET BY MOUTH AS DIRECTED (NOT COVERED), Disp: , Rfl:     tamsulosin (FLOMAX) 0.4 mg Cap, Take 0.4 mg by mouth., Disp: , Rfl:     aspirin 81 MG Chew, Take 81 mg by mouth once daily., Disp: , Rfl:      Review of Systems: See HPI    Allergies:  Patient has no known allergies.     Medical History:   has a past medical history of ADHD (attention deficit hyperactivity disorder), History of alcohol abuse, Hyperlipidemia, Hypertension, Impotence of organic origin, Joint pain, and Unspecified sensorineural hearing loss.    Surgical History:   has a past surgical history that includes Hernia repair (Bilateral) and Bone graft (Right).    Family History:  family history includes Alcohol abuse in his mother and sister; Cancer in his father and mother; Diabetes in his father and mother; Hypertension in his father and mother.    Social History:   reports that he has quit smoking. His smoking use included cigarettes. He has never used smokeless tobacco. He reports that he does not drink alcohol and does not use drugs.    Physical Exam:  /63   Pulse 62   Ht 5' 5" (1.651 m)   Wt 78.5 kg (173 lb)   SpO2 97%   BMI 28.79 kg/m²   GEN: No acute distress. Calm, comfortable  HENT: Normocephalic, atraumatic, moist mucous membranes  EYE: Anicteric sclera, non-injected.   CV: Non-diaphoretic. Regular Rate. Radial Pulses 2+.  RESP: Breathing comfortably. Chest expansion symmetric.  EXT: No clubbing, cyanosis. Peripheral edema in legs/feet  SKIN: Warm, & dry to palpation. No visible rashes or lesions of exposed skin.   PSYCH: Pleasant mood and appropriate affect. Recent and remote memory intact. "   GAIT: Mod Independent w/ quad cane, antalgic ambulation not bearing weight on the right  Lumbar Spine Exam:       Inspection: No erythema, bruising.       Palpation:   - (-) TTP of lumbar paraspinals bilaterally.  - (-) TTP of sacroiliac joint bilaterally.     ROM: (+) Limited in flexion. (-) limited in extension, (-) limitation in lateral bending bilateral.    Directional Preference: None      Provocative Maneuvers:  (-) Facet loading bilaterally  (+) Straight Leg Raise on the right   (+) SHANTE on the right w/ pain on lateral hip primarily  Hip Exam:      Inspection: No gross deformity or apparent leg length discrepancy      Palpation: (+) TTP to greater trochanteric bursa(s) on right.       ROM: (slight) limitation in internal rotation b/l, limitation in external rotation bilateral  Neurologic Exam:     Alert. Speech is fluent and appropriate.     Strength:  4/5 in b/l hip flexion, otherwise 5/5 throughout bilateral lower extremities     Sensation:  Grossly intact to light touch in bilateral lower extremities     Reflexes: 3+ in b/l patella, achilles     Tone: No abnormality appreciated in bilateral lower extremities     No Clonus     Downgoing toes on plantar stimulation     (+) Andrade bilaterally                Imaging:  - MRI Lumbar Spine 9/25/24:  Lumbar vertebral body heights and alignment are maintained. There is no suspicious marrow signal. The conus and cauda equina are unremarkable. There is colonic diverticulosis.        T12-L1:No spinal stenosis. Disc desiccation and diffuse disc bulge. Mild facet DJD with moderate foraminal narrowing.        L1-2: No spinal stenosis. Mild facet DJD with mild bilateral foraminal narrowing.        L2-3:  No spinal stenosis. Mild facet DJD with mild bilateral foraminal narrowing.        L3-4: Mild spinal stenosis. Disc desiccation and diffuse disc bulge. Mild facet DJD with mild bilateral foraminal narrowing.        L4-5: Disc desiccation and diffuse disc bulge.  Moderate spinal stenosis and  lateral recess stenosis. Advanced facet DJD with moderate to severe bilateral foraminal narrowing. There is a right-sided facet joint effusion with a synovial cyst projecting posteriorly.        L5-S1: No spinal stenosis. Mild facet DJD without foraminal narrowing.        - MRI Cervical spine 8/12/24:  FINDINGS:   The visualized anatomy at the skull base is normal. There is no evidence of any cerebellar tonsillar ectopia.        No prevertebral edema is demonstrated. There is no evidence of any edema within the interspinous ligaments to suggest any strain or sprain of those ligaments.        There is abnormal signal in the cervical spinal cord C5-C6 disc level and extending inferiorly to the upper C6-C7 disc level, most likely related to gliosis there is a significant spinal stenosis at this level.        There is preservation of cervical vertebral body stature. No edema is noted  within the cervical vertebral bodies. There is  preservation of a normal cervical lordosis.        Degenerative changes of the cervical spine are as follows:        C2/C3:  Disc desiccation. Minimal anterior spondylosis. Broad-based posterior annular bulge with ligamentum flavum hypertrophy. Right posterior  facet arthropathy. Stenosis.        C3/C4:  Disc desiccation. There is an anterior outer annular fissure. Moderate disc space narrowing. Anterior and posterior spondylosis with bilateral uncinate hypertrophy and with a right uncovertebral osteophyte disc complex and probable posterior disc herniation. Moderate right and mild left posterior facet arthropathy. Ligamentum flavum and mild posterior facet hypertrophy. Spinal stenosis. Right foraminal origin stenosis.        C4/C5:  Disc desiccation with prominent disc space narrowing and mild retrolisthesis of C4 on C5. There is a posterior outer annular fissure. Anterior and posterior spondylosis with bilateral uncinate hypertrophy and with bilateral posterior  facet and ligamentum flavum hypertrophy. There are  anterior and posterior osteophyte disc complexes. There is moderate to prominent spinal stenosis with bony bilateral foraminal stenosis, predominating on the right.        C5/C6:  Disc desiccation with prominent disc space narrowing. Bilateral uncinate hypertrophy with broad-based, posterior osteophyte disc complex. There is a left anterior outer annular fissure. Left posterior inner annular fissures are seen. There is bilateral posterior facet and ligamentum flavum  hypertrophy. Severe spinal stenosis with bilateral lateral recess and foraminal origin stenosis.        C6/C7:  Disc desiccation with moderate disc space narrowing. Anterior posterior spondylosis. Mild bilateral posterior facet arthropathy. There is  bilateral uncinate hypertrophy, left side greater than right. Broad-based posterior osteophyte disc complex. Prominent spinal stenosis with bilateral    lateral recess and foraminal stenosis, predominating on the left.        C7/T1:  Mild disc desiccation and mild disc space narrowing. Bilateral posterior facet hypertrophy. No stenosis.        - MRI Right hip 8/12/24:   No evidence of fracture or osteonecrosis. No suspicious marrow edema. No joint effusion. There is low-grade superior cartilage thinning right hip. Suspected nondisplaced tear of the anterosuperior labrum.        No soft tissue mass or collection. Mild distal right gluteal tendinopathy without evidence of high-grade tear. No evidence of iliopsoas or trochanteric bursitis.      - X-ray cervical spine 7/19/24:  Vertebral body heights maintained. Trace retrolisthesis C4 on C5. Multilevel degenerative disc height loss of the mid and lower cervical spine with osteophyte formation most prevalent at C3-4 and C4-5. Multilevel facet arthropathy present. No acute abnormality. Soft tissues unremarkable.     - X-ray lumbar spine 7/19/24:  Vertebral body heights maintained. No spondylolisthesis. L5-S1  "degenerative disc height loss. Multilevel facet arthropathy. No acute osseous abnormality. Constipation.     - X-ray hips b/l 7/19/24:  No acute osseous abnormality. Hip joint spaces maintained. Degenerative findings of the lower lumbar spine noted. Soft tissues unremarkable.         Labs:  BMP  No results found for: "NA", "K", "CL", "CO2", "BUN", "CREATININE", "CALCIUM", "ANIONGAP", "EGFRNORACEVR"  No results found for: "ALT", "AST", "GGT", "ALKPHOS", "BILITOT"  No results found for: "PLT"    Assessment:  Dannie Bowman is a 68 y.o. male with the following diagnoses based on history, exam, and imaging:    Problem List Items Addressed This Visit    None  Visit Diagnoses       Lumbar radiculopathy    -  Primary    Weakness of right lower extremity        Greater trochanteric bursitis of right hip        Chronic right-sided low back pain with right-sided sciatica                    This is a pleasant 68 y.o. gentleman presenting with:     - Chronic right hip pain radiating down to the right knee: Suspect primarily due to GTBursitis and having some ITB syndrome with weakness in pelvis/hips   - Minimal findings on hip x-ray, outside of degenerative changes of spine.   - Right hip MRI w/ mild distal right gluteal tendinopathy and nondisplaced tear of anterosuperior labrum, no findings of AVN.  - Patient does have radiating pain down the right leg and (+) SLR on the right indicating possible radiculopathy causing referred pain and weakness.   - Lumbar MRI with arthritic changes which are most significant at the L4-5 level, and this creates moderate canal stenosis and lateral recess stenosis with moderate to severe bilateral foraminal narrowing at L4-5.  There is an effusion of the right L4-5 facet with posteriorly extending synovial cyst.   - - Patient with Lumbosacral radiculopathy/radicular pain due to stenosis at L4-5, .. The pain is severe enough to greatly impact quality of life &/or function as evidenced on the " patients PEG score and NRS.    - Pain persists despite > 4 weeks of conservative treatment.   - Neck pain: (+) bridges, brisk reflexes on exam.   - MRI with arthritic changes which creates severe canal stenosis at multiple levels, most severe at C5-6 with gliosis at this level.    - S/p cervical decompression  - Comorbidities: HTN. H/o EtOH abuse. On ASA for primary prevention     Treatment Plan:   - PT/OT/HEP: Cont HEP. Discussed benefits of exercise for pain.   - Procedures: Schedule right L4 & right L5 transforaminal epidural steroid injection under fluoroscopic guidance with local/MAC sedation. (CPT Code 05896 & 26376). The patient is not allergic to iodinated contrast which will be used for the procedure.. Patient is taking ASA.  After discussion of risks & benefits of holding vs continuing medication, we will request permission from prescribing physician to hold ASA for 5 days prior to procedure, to be restarted 24 hrs after procedure.    - Medications: No changes at this time.  - Imaging: Reviewed.  - Labs: None. Re- Request labs from PCP (CBC, CMP, A1c)        Follow Up: RTC 2 weeks after JOEL or sooner PRN.    I spent a total of 40 minutes on the day of the visit. This includes face to face time and non-face to face time preparing to see the patient (eg, review of tests), obtaining and/or reviewing separately obtained history, documenting clinical information in the electronic or other health record, independently interpreting results and communicating results to the patient/family/caregiver, or care coordinator.    Sumi Kurtz PA-C  Interventional Pain Medicine

## 2024-10-17 ENCOUNTER — PATIENT MESSAGE (OUTPATIENT)
Dept: RESEARCH | Facility: HOSPITAL | Age: 69
End: 2024-10-17
Payer: COMMERCIAL

## 2024-10-28 ENCOUNTER — OUTSIDE PLACE OF SERVICE (OUTPATIENT)
Dept: PAIN MEDICINE | Facility: CLINIC | Age: 69
End: 2024-10-28

## 2024-10-28 PROCEDURE — 64483 NJX AA&/STRD TFRM EPI L/S 1: CPT | Mod: RT,,, | Performed by: PHYSICAL MEDICINE & REHABILITATION

## 2024-10-28 PROCEDURE — 64484 NJX AA&/STRD TFRM EPI L/S EA: CPT | Mod: RT,,, | Performed by: PHYSICAL MEDICINE & REHABILITATION

## 2024-11-13 ENCOUNTER — OFFICE VISIT (OUTPATIENT)
Dept: PAIN MEDICINE | Facility: CLINIC | Age: 69
End: 2024-11-13
Payer: COMMERCIAL

## 2024-11-13 VITALS
HEART RATE: 67 BPM | BODY MASS INDEX: 28.83 KG/M2 | WEIGHT: 173.06 LBS | HEIGHT: 65 IN | DIASTOLIC BLOOD PRESSURE: 68 MMHG | SYSTOLIC BLOOD PRESSURE: 127 MMHG

## 2024-11-13 DIAGNOSIS — M54.16 LUMBAR RADICULOPATHY: ICD-10-CM

## 2024-11-13 DIAGNOSIS — M70.61 GREATER TROCHANTERIC BURSITIS OF RIGHT HIP: ICD-10-CM

## 2024-11-13 DIAGNOSIS — M54.41 CHRONIC RIGHT-SIDED LOW BACK PAIN WITH RIGHT-SIDED SCIATICA: Primary | ICD-10-CM

## 2024-11-13 DIAGNOSIS — G95.9 CERVICAL MYELOPATHY: ICD-10-CM

## 2024-11-13 DIAGNOSIS — R29.898 WEAKNESS OF RIGHT LOWER EXTREMITY: ICD-10-CM

## 2024-11-13 DIAGNOSIS — G89.29 CHRONIC RIGHT-SIDED LOW BACK PAIN WITH RIGHT-SIDED SCIATICA: Primary | ICD-10-CM

## 2024-11-13 DIAGNOSIS — M67.951 TENDINOPATHY OF RIGHT GLUTEAL REGION: ICD-10-CM

## 2024-11-13 PROCEDURE — 4010F ACE/ARB THERAPY RXD/TAKEN: CPT | Mod: CPTII,,, | Performed by: PHYSICAL MEDICINE & REHABILITATION

## 2024-11-13 PROCEDURE — 20611 DRAIN/INJ JOINT/BURSA W/US: CPT | Mod: S$PBB,RT,, | Performed by: PHYSICAL MEDICINE & REHABILITATION

## 2024-11-13 PROCEDURE — 99214 OFFICE O/P EST MOD 30 MIN: CPT | Mod: S$PBB,25,, | Performed by: PHYSICAL MEDICINE & REHABILITATION

## 2024-11-13 PROCEDURE — 3078F DIAST BP <80 MM HG: CPT | Mod: CPTII,,, | Performed by: PHYSICAL MEDICINE & REHABILITATION

## 2024-11-13 PROCEDURE — 1159F MED LIST DOCD IN RCRD: CPT | Mod: CPTII,,, | Performed by: PHYSICAL MEDICINE & REHABILITATION

## 2024-11-13 PROCEDURE — 1125F AMNT PAIN NOTED PAIN PRSNT: CPT | Mod: CPTII,,, | Performed by: PHYSICAL MEDICINE & REHABILITATION

## 2024-11-13 PROCEDURE — 3074F SYST BP LT 130 MM HG: CPT | Mod: CPTII,,, | Performed by: PHYSICAL MEDICINE & REHABILITATION

## 2024-11-13 PROCEDURE — 1160F RVW MEDS BY RX/DR IN RCRD: CPT | Mod: CPTII,,, | Performed by: PHYSICAL MEDICINE & REHABILITATION

## 2024-11-13 PROCEDURE — 3008F BODY MASS INDEX DOCD: CPT | Mod: CPTII,,, | Performed by: PHYSICAL MEDICINE & REHABILITATION

## 2024-11-13 RX ORDER — LIDOCAINE HYDROCHLORIDE 10 MG/ML
3 INJECTION, SOLUTION INFILTRATION; PERINEURAL
Status: DISCONTINUED | OUTPATIENT
Start: 2024-11-13 | End: 2024-11-13 | Stop reason: HOSPADM

## 2024-11-13 RX ORDER — DIAZEPAM 5 MG/1
5 TABLET ORAL EVERY 6 HOURS PRN
COMMUNITY
Start: 2024-08-14 | End: 2024-11-13

## 2024-11-13 RX ORDER — METHYLPREDNISOLONE ACETATE 40 MG/ML
40 INJECTION, SUSPENSION INTRA-ARTICULAR; INTRALESIONAL; INTRAMUSCULAR; SOFT TISSUE
Status: DISCONTINUED | OUTPATIENT
Start: 2024-11-13 | End: 2024-11-13 | Stop reason: HOSPADM

## 2024-11-13 RX ADMIN — METHYLPREDNISOLONE ACETATE 40 MG: 40 INJECTION, SUSPENSION INTRA-ARTICULAR; INTRALESIONAL; INTRAMUSCULAR; SOFT TISSUE at 01:11

## 2024-11-13 RX ADMIN — LIDOCAINE HYDROCHLORIDE 3 ML: 10 INJECTION, SOLUTION INFILTRATION; PERINEURAL at 01:11

## 2024-11-13 NOTE — PROCEDURES
Large Joint Aspiration/Injection: R greater trochanteric bursa    Date/Time: 11/13/2024 1:40 PM    Performed by: Daphne Call MD  Authorized by: Daphne Call MD    Consent Done?:  Yes (Written)  Indications:  Pain  Site marked: the procedure site was marked    Timeout: prior to procedure the correct patient, procedure, and site was verified    Prep: patient was prepped and draped in usual sterile fashion    Local anesthesia used?: No      Details:  Needle Size:  22 G  Ultrasonic Guidance for needle placement?: Yes    Images are saved and documented.  Approach:  Lateral  Location:  Hip  Site:  R greater trochanteric bursa  Medications:  3 mL LIDOcaine HCL 10 mg/ml (1%) 10 mg/mL (1 %); 40 mg methylPREDNISolone acetate 40 mg/mL  Patient tolerance:  Patient tolerated the procedure well with no immediate complications

## 2024-11-13 NOTE — PROGRESS NOTES
[facility]          Chief Complaint:   Chief Complaint   Patient presents with    Hip Pain     right       History of Present Illness: Dannie Bowman is a 69 y.o. male referred by Dr. Eleanor Roldan for right hip pain.      Onset: 1 year, insidious  Location: right lateral hip  Radiation: right lateral thigh to the knee  Timing: intermittent  Quality: Aching, Tight, Deep, and Sharp  Exacerbating Factors: walking, standing, and lifting  Alleviating Factors: lying down, sitting, repositioning , and medications  Associated Symptoms: He feels weakness in the right leg. He gets occasional numbness in the leg if sitting for a long time. He has a history of skin cancer. Pain will wake him at night. He denies night fever/night sweats, urinary incontinence/change in function, bowel incontinence/change in function, and unexplained weight loss    Patient has failed > 6 weeks of conservative treatment including: Activity modification (avoiding exacerbating factors), physical therapy, and oral medications. He is using a quad cane. He has tried ibuprofen. He has tried CBD oil.     Severity: Currently: 3/10   Typical Range: 2-8/10     Exacerbation: 8/10     Functional Limitations: Moderate to severe pain is limiting Sleep, Work, and Recreation.    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  At the last clinic visit, continue HEP, Performed right GTB CSI w/ US guidance, Ordered x-ray of  hip & C-spine. Ordered MRI of right hip and C-spine given UMN signs on exam.     Right GTB CSI w/ US guidance provided 0% relief.    Currently, the hip pain is stable. Continues to have pain to lateral hip, into lateral thigh and into knee. Denies new weakness or numbness.     He did have MRI of cervical spine, which revealed severe canal stenosis, worst at C5-6. He denies neck pain, but does report dropping  items repetitively, difficulty with buttons and changes in gait.  Denies changes in bowel or bladder function. Denies recent fevers or infections. Denies unexplained weight loss. He is scheduled to see Dr. Leone tomorrow for surgical evaluation of neck..       Current Pain Scales:  Current: 4/10              Typical Range: 3-8/10     Interval History (10/09/2024):  Dannie Bowman returns today for follow up.  At the last clinic visit, ordered imaging,  Plan for likely lumbar TF JOEL pending advanced imaging.     Currently, the right lower back is stable. Pain continues to be localized to right lower back with radiation into right hip, right lateral thigh, not past the knee. Denies any changes in bowel or bladder function. Denies any new weakness or new numbness since the most recent visit. Denies any fevers or recent infections/antibiotics.     Since prior visit, he underwent cervical decompression (C4-6, partial C7 laminectomy) with Dr. Leone. He is approximately 7 weeks out and is doing well.  He just completed 6 weeks of PT, which provided minimal relief.     Current Pain Scales:  Current: 4/10              Typical Range: 3-7/10     Interval History (11/13/2024):  Dannie Bowman returns today for follow up.  At the last clinic visit, Cont HEP. Discussed benefits of exercise for pain, scheduled right L4 & right L5 transforaminal epidural steroid injection     Right L4 & right L5 TF JOEL provided 100% relief of the leg pain nad back pain, but he is still having the right lateral hip pain.    Currently, the lower leg pain is improved.  However, the right lateral hip pain is stable.     Current Pain Scales:  Current: 5/10              Typical Range: 3-7/10     Current PEG Score: 5    Opioid Risk Score         Value Time User    Opioid Risk Score  8 8/14/2024  2:37 PM Stanley Gallo MA             Previous Interventions:  - 10/29/24: Right L4 & L5 TF JOEL w/ 100% relief of right leg pain  - 7/19/24: Right GTB w/ US  guidance provided 0% relief.    Previous Therapies:  PT/OT: yes   Relevant Surgery: C4-6, partial C7 laminectomy 8/19/24   Previous Medications:   - NSAIDS: ibuprofen  - Muscle Relaxants:    - TCAs:   - SNRIs:   - Topicals: CBD oil  - Anticonvulsants:    - Opioids:     Current Pain Medications:  Ibuprofen   Baclofen 5mg    Blood Thinners: None    Full Medication List:    Current Outpatient Medications:     aspirin 81 MG Chew, Take 81 mg by mouth once daily., Disp: , Rfl:     baclofen (LIORESAL) 5 mg Tab tablet, TAKE 1 TABLET BY MOUTH EVERY DAY AT BEDTIME FOR 1 WEEK, 2X/DAY FOR 1 WEEK, THEN 3X/DAY, Disp: , Rfl:     finasteride (PROSCAR) 5 mg tablet, 5 mg., Disp: , Rfl:     hydroCHLOROthiazide (HYDRODIURIL) 12.5 MG Tab, Take 12.5 mg by mouth., Disp: , Rfl:     irbesartan (AVAPRO) 300 MG tablet, Take 300 mg by mouth., Disp: , Rfl:     nebivoloL (BYSTOLIC) 10 MG Tab, 10 mg., Disp: , Rfl:     rosuvastatin (CRESTOR) 10 MG tablet, Take 10 mg by mouth., Disp: , Rfl:     tadalafiL (CIALIS) 20 MG Tab, TAKE 1 TABLET BY MOUTH AS DIRECTED (NOT COVERED), Disp: , Rfl:     tamsulosin (FLOMAX) 0.4 mg Cap, Take 0.4 mg by mouth., Disp: , Rfl:      Review of Systems: See HPI    Allergies:  Patient has no known allergies.     Medical History:   has a past medical history of ADHD (attention deficit hyperactivity disorder), History of alcohol abuse, Hyperlipidemia, Hypertension, Impotence of organic origin, Joint pain, and Unspecified sensorineural hearing loss.    Surgical History:   has a past surgical history that includes Hernia repair (Bilateral) and Bone graft (Right).    Family History:  family history includes Alcohol abuse in his mother and sister; Cancer in his father and mother; Diabetes in his father and mother; Hypertension in his father and mother.    Social History:   reports that he has quit smoking. His smoking use included cigarettes. He has never used smokeless tobacco. He reports that he does not drink alcohol and does  "not use drugs.    Physical Exam:  /68   Pulse 67   Ht 5' 5" (1.651 m)   Wt 78.5 kg (173 lb 1 oz)   BMI 28.80 kg/m²   GEN: No acute distress. Calm, comfortable  HENT: Normocephalic, atraumatic, moist mucous membranes  EYE: Anicteric sclera, non-injected.   CV: Non-diaphoretic. Regular Rate. Radial Pulses 2+.  RESP: Breathing comfortably. Chest expansion symmetric.  EXT: No clubbing, cyanosis. Peripheral edema in legs/feet  SKIN: Warm, & dry to palpation. No visible rashes or lesions of exposed skin.   PSYCH: Pleasant mood and appropriate affect. Recent and remote memory intact.   GAIT: Mod Independent w/ quad cane, antalgic ambulation not bearing weight on the right  Lumbar Spine Exam:       Inspection: No erythema, bruising.       Palpation:   - (-) TTP of lumbar paraspinals bilaterally.  - (-) TTP of sacroiliac joint bilaterally.     ROM: (+) Limited in flexion. (-) limited in extension, (-) limitation in lateral bending bilateral.    Directional Preference: None      Provocative Maneuvers:  (-) Facet loading bilaterally  (+) Straight Leg Raise on the right   (+) SHANTE on the right w/ pain on lateral hip primarily  Hip Exam:      Inspection: No gross deformity or apparent leg length discrepancy      Palpation: (+) TTP to greater trochanteric bursa(s) on right.       ROM: (slight) limitation in internal rotation b/l, limitation in external rotation bilateral  Neurologic Exam:     Alert. Speech is fluent and appropriate.     Strength:  4/5 in b/l hip flexion, otherwise 5/5 throughout bilateral lower extremities     Sensation:  Grossly intact to light touch in bilateral lower extremities     Reflexes: 3+ in b/l patella, achilles     Tone: No abnormality appreciated in bilateral lower extremities     No Clonus     Downgoing toes on plantar stimulation     (+) Andrade bilaterally                Imaging:  - MRI Lumbar Spine 9/25/24:  Lumbar vertebral body heights and alignment are maintained. There is no " suspicious marrow signal. The conus and cauda equina are unremarkable. There is colonic diverticulosis.        T12-L1:No spinal stenosis. Disc desiccation and diffuse disc bulge. Mild facet DJD with moderate foraminal narrowing.        L1-2: No spinal stenosis. Mild facet DJD with mild bilateral foraminal narrowing.        L2-3:  No spinal stenosis. Mild facet DJD with mild bilateral foraminal narrowing.        L3-4: Mild spinal stenosis. Disc desiccation and diffuse disc bulge. Mild facet DJD with mild bilateral foraminal narrowing.        L4-5: Disc desiccation and diffuse disc bulge. Moderate spinal stenosis and  lateral recess stenosis. Advanced facet DJD with moderate to severe bilateral foraminal narrowing. There is a right-sided facet joint effusion with a synovial cyst projecting posteriorly.        L5-S1: No spinal stenosis. Mild facet DJD without foraminal narrowing.        - MRI Cervical spine 8/12/24:  FINDINGS:   The visualized anatomy at the skull base is normal. There is no evidence of any cerebellar tonsillar ectopia.        No prevertebral edema is demonstrated. There is no evidence of any edema within the interspinous ligaments to suggest any strain or sprain of those ligaments.        There is abnormal signal in the cervical spinal cord C5-C6 disc level and extending inferiorly to the upper C6-C7 disc level, most likely related to gliosis there is a significant spinal stenosis at this level.        There is preservation of cervical vertebral body stature. No edema is noted  within the cervical vertebral bodies. There is  preservation of a normal cervical lordosis.        Degenerative changes of the cervical spine are as follows:        C2/C3:  Disc desiccation. Minimal anterior spondylosis. Broad-based posterior annular bulge with ligamentum flavum hypertrophy. Right posterior  facet arthropathy. Stenosis.        C3/C4:  Disc desiccation. There is an anterior outer annular fissure. Moderate disc  space narrowing. Anterior and posterior spondylosis with bilateral uncinate hypertrophy and with a right uncovertebral osteophyte disc complex and probable posterior disc herniation. Moderate right and mild left posterior facet arthropathy. Ligamentum flavum and mild posterior facet hypertrophy. Spinal stenosis. Right foraminal origin stenosis.        C4/C5:  Disc desiccation with prominent disc space narrowing and mild retrolisthesis of C4 on C5. There is a posterior outer annular fissure. Anterior and posterior spondylosis with bilateral uncinate hypertrophy and with bilateral posterior facet and ligamentum flavum hypertrophy. There are  anterior and posterior osteophyte disc complexes. There is moderate to prominent spinal stenosis with bony bilateral foraminal stenosis, predominating on the right.        C5/C6:  Disc desiccation with prominent disc space narrowing. Bilateral uncinate hypertrophy with broad-based, posterior osteophyte disc complex. There is a left anterior outer annular fissure. Left posterior inner annular fissures are seen. There is bilateral posterior facet and ligamentum flavum  hypertrophy. Severe spinal stenosis with bilateral lateral recess and foraminal origin stenosis.        C6/C7:  Disc desiccation with moderate disc space narrowing. Anterior posterior spondylosis. Mild bilateral posterior facet arthropathy. There is  bilateral uncinate hypertrophy, left side greater than right. Broad-based posterior osteophyte disc complex. Prominent spinal stenosis with bilateral    lateral recess and foraminal stenosis, predominating on the left.        C7/T1:  Mild disc desiccation and mild disc space narrowing. Bilateral posterior facet hypertrophy. No stenosis.        - MRI Right hip 8/12/24:   No evidence of fracture or osteonecrosis. No suspicious marrow edema. No joint effusion. There is low-grade superior cartilage thinning right hip. Suspected nondisplaced tear of the anterosuperior labrum.   "      No soft tissue mass or collection. Mild distal right gluteal tendinopathy without evidence of high-grade tear. No evidence of iliopsoas or trochanteric bursitis.      - X-ray cervical spine 7/19/24:  Vertebral body heights maintained. Trace retrolisthesis C4 on C5. Multilevel degenerative disc height loss of the mid and lower cervical spine with osteophyte formation most prevalent at C3-4 and C4-5. Multilevel facet arthropathy present. No acute abnormality. Soft tissues unremarkable.     - X-ray lumbar spine 7/19/24:  Vertebral body heights maintained. No spondylolisthesis. L5-S1 degenerative disc height loss. Multilevel facet arthropathy. No acute osseous abnormality. Constipation.     - X-ray hips b/l 7/19/24:  No acute osseous abnormality. Hip joint spaces maintained. Degenerative findings of the lower lumbar spine noted. Soft tissues unremarkable.         Labs:  BMP  No results found for: "NA", "K", "CL", "CO2", "BUN", "CREATININE", "CALCIUM", "ANIONGAP", "EGFRNORACEVR"  No results found for: "ALT", "AST", "GGT", "ALKPHOS", "BILITOT"  No results found for: "PLT"    Assessment:  Dannie Bowman is a 69 y.o. male with the following diagnoses based on history, exam, and imaging:    Problem List Items Addressed This Visit    None  Visit Diagnoses       Chronic right-sided low back pain with right-sided sciatica    -  Primary    Cervical myelopathy        Relevant Orders    Ambulatory referral/consult to Physical/Occupational Therapy    Weakness of right lower extremity        Relevant Orders    Ambulatory referral/consult to Physical/Occupational Therapy    Lumbar radiculopathy        Relevant Orders    Ambulatory referral/consult to Physical/Occupational Therapy    Greater trochanteric bursitis of right hip        Relevant Orders    Large Joint Aspiration/Injection: R greater trochanteric bursa    Tendinopathy of right gluteal region                      This is a pleasant 69 y.o. gentleman presenting with: "     - Chronic right hip pain radiating down to the right knee: Suspect primarily due to GTBursitis and having some ITB syndrome with weakness in pelvis/hips   - Minimal findings on hip x-ray, outside of degenerative changes of spine.   - Right hip MRI w/ mild distal right gluteal tendinopathy and nondisplaced tear of anterosuperior labrum, no findings of AVN.  - Patient does have radiating pain down the right leg and (+) SLR on the right indicating possible radiculopathy causing referred pain and weakness.   - Lumbar MRI with arthritic changes which are most significant at the L4-5 level, and this creates moderate canal stenosis and lateral recess stenosis with moderate to severe bilateral foraminal narrowing at L4-5.  There is an effusion of the right L4-5 facet with posteriorly extending synovial cyst.   - - Patient with Lumbosacral radiculopathy/radicular pain due to stenosis at L4-5, .. The pain is severe enough to greatly impact quality of life &/or function as evidenced on the patients PEG score and NRS.    - Pain persists despite > 4 weeks of conservative treatment.   - Neck pain: (+) bridges, brisk reflexes on exam.   - MRI with arthritic changes which creates severe canal stenosis at multiple levels, most severe at C5-6 with gliosis at this level.    - S/p cervical decompression  - Comorbidities: HTN. H/o EtOH abuse. On ASA for primary prevention     Treatment Plan:   - PT/OT/HEP: Refer back to PT to work on strength and gait. Discussed benefits of exercise for pain.   - Procedures: Performed right GTB CSI w/ US guidance.   - Can repeat right L4 & right L5 PRN.    - Medications: No changes at this time. Can take over baclofen if patient needs us too to assist NSGY.   - Imaging: Reviewed.  - Labs: None.         Follow Up: RTC 3-4 months or sooner PRN.        Daphne Call MD  Interventional Pain Medicine

## 2025-02-18 ENCOUNTER — OFFICE VISIT (OUTPATIENT)
Dept: PAIN MEDICINE | Facility: CLINIC | Age: 70
End: 2025-02-18
Payer: COMMERCIAL

## 2025-02-18 VITALS
HEART RATE: 57 BPM | BODY MASS INDEX: 28.83 KG/M2 | WEIGHT: 173.06 LBS | HEIGHT: 65 IN | SYSTOLIC BLOOD PRESSURE: 129 MMHG | DIASTOLIC BLOOD PRESSURE: 75 MMHG

## 2025-02-18 DIAGNOSIS — S73.191D TEAR OF RIGHT ACETABULAR LABRUM, SUBSEQUENT ENCOUNTER: Primary | ICD-10-CM

## 2025-02-18 DIAGNOSIS — M25.551 CHRONIC RIGHT HIP PAIN: ICD-10-CM

## 2025-02-18 DIAGNOSIS — M25.551 PAIN IN JOINT OF RIGHT HIP: Primary | ICD-10-CM

## 2025-02-18 DIAGNOSIS — M16.11 PRIMARY OSTEOARTHRITIS OF RIGHT HIP: ICD-10-CM

## 2025-02-18 DIAGNOSIS — G89.29 CHRONIC RIGHT HIP PAIN: ICD-10-CM

## 2025-02-18 NOTE — PROGRESS NOTES
[facility]          Chief Complaint:   Chief Complaint   Patient presents with    Hip Pain      right       History of Present Illness: Dannie Bowman is a 69 y.o. male referred by Dr. Eleanor Roldan for right hip pain.      Onset: 1 year, insidious  Location: right lateral hip  Radiation: right lateral thigh to the knee  Timing: intermittent  Quality: Aching, Tight, Deep, and Sharp  Exacerbating Factors: walking, standing, and lifting  Alleviating Factors: lying down, sitting, repositioning , and medications  Associated Symptoms: He feels weakness in the right leg. He gets occasional numbness in the leg if sitting for a long time. He has a history of skin cancer. Pain will wake him at night. He denies night fever/night sweats, urinary incontinence/change in function, bowel incontinence/change in function, and unexplained weight loss    Patient has failed > 6 weeks of conservative treatment including: Activity modification (avoiding exacerbating factors), physical therapy, and oral medications. He is using a quad cane. He has tried ibuprofen. He has tried CBD oil.     Severity: Currently: 3/10   Typical Range: 2-8/10     Exacerbation: 8/10     Functional Limitations: Moderate to severe pain is limiting Sleep, Work, and Recreation.    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  At the last clinic visit, continue HEP, Performed right GTB CSI w/ US guidance, Ordered x-ray of  hip & C-spine. Ordered MRI of right hip and C-spine given UMN signs on exam.     Right GTB CSI w/ US guidance provided 0% relief.    Currently, the hip pain is stable. Continues to have pain to lateral hip, into lateral thigh and into knee. Denies new weakness or numbness.     He did have MRI of cervical spine, which revealed severe canal stenosis, worst at C5-6. He denies neck pain, but does report dropping  items repetitively, difficulty with buttons and changes in gait.  Denies changes in bowel or bladder function. Denies recent fevers or infections. Denies unexplained weight loss. He is scheduled to see Dr. Leone tomorrow for surgical evaluation of neck..       Current Pain Scales:  Current: 4/10              Typical Range: 3-8/10     Interval History (10/09/2024):  Dannie Bowman returns today for follow up.  At the last clinic visit, ordered imaging,  Plan for likely lumbar TF JOEL pending advanced imaging.     Currently, the right lower back is stable. Pain continues to be localized to right lower back with radiation into right hip, right lateral thigh, not past the knee. Denies any changes in bowel or bladder function. Denies any new weakness or new numbness since the most recent visit. Denies any fevers or recent infections/antibiotics.     Since prior visit, he underwent cervical decompression (C4-6, partial C7 laminectomy) with Dr. Leone. He is approximately 7 weeks out and is doing well.  He just completed 6 weeks of PT, which provided minimal relief.     Current Pain Scales:  Current: 4/10              Typical Range: 3-7/10     Interval History (11/13/2024):  Dannie Bowman returns today for follow up.  At the last clinic visit, Cont HEP. Discussed benefits of exercise for pain, scheduled right L4 & right L5 transforaminal epidural steroid injection     Right L4 & right L5 TF JOEL provided 100% relief of the leg pain nad back pain, but he is still having the right lateral hip pain.    Currently, the lower leg pain is improved.  However, the right lateral hip pain is stable.     Current Pain Scales:  Current: 5/10              Typical Range: 3-7/10       Interval History (02/18/2025):  Dannie Bowman returns today for follow up.  At the last clinic visit, referred to physical therapy Refer back to PT to work on strength and gait. Performed right GTB CSI w/ US guidance.     Physical therapy provided 20% relief. Right  GTB CSI w/ minimal relief.     Currently, the  right hip pain is stable.      Current Pain Scales:  Current: 3/10              Average: 4/10  Least-Worst: 3-6/10    Current PEG Score: 4     Opioid Risk Score         Value Time User    Opioid Risk Score  8 8/14/2024  2:37 PM Stanley Gallo MA             Previous Interventions:  - 10/29/24: Right L4 & L5 TF JOEL w/ 100% relief of right leg pain  - 7/19/24: Right GTB w/ US guidance provided 0% relief.    Previous Therapies:  PT/OT: yes   Relevant Surgery: C4-6, partial C7 laminectomy 8/19/24   Previous Medications:   - NSAIDS: ibuprofen  - Muscle Relaxants:    - TCAs:   - SNRIs:   - Topicals: CBD oil  - Anticonvulsants:    - Opioids:     Current Pain Medications:  Ibuprofen   Baclofen 5mg    Blood Thinners: None    Full Medication List:    Current Outpatient Medications:     aspirin 81 MG Chew, Take 81 mg by mouth once daily., Disp: , Rfl:     finasteride (PROSCAR) 5 mg tablet, 5 mg., Disp: , Rfl:     hydroCHLOROthiazide (HYDRODIURIL) 12.5 MG Tab, Take 12.5 mg by mouth., Disp: , Rfl:     irbesartan (AVAPRO) 300 MG tablet, Take 300 mg by mouth., Disp: , Rfl:     nebivoloL (BYSTOLIC) 10 MG Tab, 10 mg., Disp: , Rfl:     rosuvastatin (CRESTOR) 10 MG tablet, Take 10 mg by mouth., Disp: , Rfl:     tadalafiL (CIALIS) 20 MG Tab, TAKE 1 TABLET BY MOUTH AS DIRECTED (NOT COVERED), Disp: , Rfl:     tamsulosin (FLOMAX) 0.4 mg Cap, Take 0.4 mg by mouth., Disp: , Rfl:      Review of Systems: See HPI    Allergies:  Patient has no known allergies.     Medical History:   has a past medical history of ADHD (attention deficit hyperactivity disorder), History of alcohol abuse, Hyperlipidemia, Hypertension, Impotence of organic origin, Joint pain, and Unspecified sensorineural hearing loss.    Surgical History:   has a past surgical history that includes Hernia repair (Bilateral) and Bone graft (Right).    Family History:  family history includes Alcohol abuse in his mother and sister;  "Cancer in his father and mother; Diabetes in his father and mother; Hypertension in his father and mother.    Social History:   reports that he has quit smoking. His smoking use included cigarettes. He has never used smokeless tobacco. He reports that he does not drink alcohol and does not use drugs.    Physical Exam:  /75   Pulse (!) 57   Ht 5' 5" (1.651 m)   Wt 78.5 kg (173 lb 1 oz)   BMI 28.80 kg/m²   GEN: No acute distress. Calm, comfortable  HENT: Normocephalic, atraumatic, moist mucous membranes  EYE: Anicteric sclera, non-injected.   CV: Non-diaphoretic. Regular Rate. Radial Pulses 2+.  RESP: Breathing comfortably. Chest expansion symmetric.  EXT: No clubbing, cyanosis. Peripheral edema in legs/feet  SKIN: Warm, & dry to palpation. No visible rashes or lesions of exposed skin.   PSYCH: Pleasant mood and appropriate affect. Recent and remote memory intact.   GAIT: Mod Independent w/ quad cane, antalgic ambulation not bearing weight on the right  Lumbar Spine Exam:       Inspection: No erythema, bruising.       Palpation:   - (-) TTP of lumbar paraspinals bilaterally.  - (-) TTP of sacroiliac joint bilaterally.     ROM: (+) Limited in flexion. (-) limited in extension, (-) limitation in lateral bending bilateral.    Directional Preference: None      Provocative Maneuvers:  (-) Facet loading bilaterally  (+) Straight Leg Raise on the right   (+) SHANTE on the right w/ pain on lateral hip primarily  Hip Exam:      Inspection: No gross deformity or apparent leg length discrepancy      Palpation: (+) TTP to greater trochanteric bursa(s) on right.       ROM: (slight) limitation in internal rotation b/l, limitation in external rotation bilateral  Neurologic Exam:     Alert. Speech is fluent and appropriate.     Strength:  4/5 in b/l hip flexion, otherwise 5/5 throughout bilateral lower extremities     Sensation:  Grossly intact to light touch in bilateral lower extremities     Reflexes: 3+ in b/l patella, " achilles     Tone: No abnormality appreciated in bilateral lower extremities     No Clonus     Downgoing toes on plantar stimulation     (+) Andrade bilaterally                Imaging:  - MRI Lumbar Spine 9/25/24:  Lumbar vertebral body heights and alignment are maintained. There is no suspicious marrow signal. The conus and cauda equina are unremarkable. There is colonic diverticulosis.        T12-L1:No spinal stenosis. Disc desiccation and diffuse disc bulge. Mild facet DJD with moderate foraminal narrowing.        L1-2: No spinal stenosis. Mild facet DJD with mild bilateral foraminal narrowing.        L2-3:  No spinal stenosis. Mild facet DJD with mild bilateral foraminal narrowing.        L3-4: Mild spinal stenosis. Disc desiccation and diffuse disc bulge. Mild facet DJD with mild bilateral foraminal narrowing.        L4-5: Disc desiccation and diffuse disc bulge. Moderate spinal stenosis and  lateral recess stenosis. Advanced facet DJD with moderate to severe bilateral foraminal narrowing. There is a right-sided facet joint effusion with a synovial cyst projecting posteriorly.        L5-S1: No spinal stenosis. Mild facet DJD without foraminal narrowing.        - MRI Cervical spine 8/12/24:  FINDINGS:   The visualized anatomy at the skull base is normal. There is no evidence of any cerebellar tonsillar ectopia.        No prevertebral edema is demonstrated. There is no evidence of any edema within the interspinous ligaments to suggest any strain or sprain of those ligaments.        There is abnormal signal in the cervical spinal cord C5-C6 disc level and extending inferiorly to the upper C6-C7 disc level, most likely related to gliosis there is a significant spinal stenosis at this level.        There is preservation of cervical vertebral body stature. No edema is noted  within the cervical vertebral bodies. There is  preservation of a normal cervical lordosis.        Degenerative changes of the cervical spine  are as follows:        C2/C3:  Disc desiccation. Minimal anterior spondylosis. Broad-based posterior annular bulge with ligamentum flavum hypertrophy. Right posterior  facet arthropathy. Stenosis.        C3/C4:  Disc desiccation. There is an anterior outer annular fissure. Moderate disc space narrowing. Anterior and posterior spondylosis with bilateral uncinate hypertrophy and with a right uncovertebral osteophyte disc complex and probable posterior disc herniation. Moderate right and mild left posterior facet arthropathy. Ligamentum flavum and mild posterior facet hypertrophy. Spinal stenosis. Right foraminal origin stenosis.        C4/C5:  Disc desiccation with prominent disc space narrowing and mild retrolisthesis of C4 on C5. There is a posterior outer annular fissure. Anterior and posterior spondylosis with bilateral uncinate hypertrophy and with bilateral posterior facet and ligamentum flavum hypertrophy. There are  anterior and posterior osteophyte disc complexes. There is moderate to prominent spinal stenosis with bony bilateral foraminal stenosis, predominating on the right.        C5/C6:  Disc desiccation with prominent disc space narrowing. Bilateral uncinate hypertrophy with broad-based, posterior osteophyte disc complex. There is a left anterior outer annular fissure. Left posterior inner annular fissures are seen. There is bilateral posterior facet and ligamentum flavum  hypertrophy. Severe spinal stenosis with bilateral lateral recess and foraminal origin stenosis.        C6/C7:  Disc desiccation with moderate disc space narrowing. Anterior posterior spondylosis. Mild bilateral posterior facet arthropathy. There is  bilateral uncinate hypertrophy, left side greater than right. Broad-based posterior osteophyte disc complex. Prominent spinal stenosis with bilateral    lateral recess and foraminal stenosis, predominating on the left.        C7/T1:  Mild disc desiccation and mild disc space narrowing.  "Bilateral posterior facet hypertrophy. No stenosis.        - MRI Right hip 8/12/24:   No evidence of fracture or osteonecrosis. No suspicious marrow edema. No joint effusion. There is low-grade superior cartilage thinning right hip. Suspected nondisplaced tear of the anterosuperior labrum.        No soft tissue mass or collection. Mild distal right gluteal tendinopathy without evidence of high-grade tear. No evidence of iliopsoas or trochanteric bursitis.      - X-ray cervical spine 7/19/24:  Vertebral body heights maintained. Trace retrolisthesis C4 on C5. Multilevel degenerative disc height loss of the mid and lower cervical spine with osteophyte formation most prevalent at C3-4 and C4-5. Multilevel facet arthropathy present. No acute abnormality. Soft tissues unremarkable.     - X-ray lumbar spine 7/19/24:  Vertebral body heights maintained. No spondylolisthesis. L5-S1 degenerative disc height loss. Multilevel facet arthropathy. No acute osseous abnormality. Constipation.     - X-ray hips b/l 7/19/24:  No acute osseous abnormality. Hip joint spaces maintained. Degenerative findings of the lower lumbar spine noted. Soft tissues unremarkable.         Labs:  BMP  No results found for: "NA", "K", "CL", "CO2", "BUN", "CREATININE", "CALCIUM", "ANIONGAP", "EGFRNORACEVR"  No results found for: "ALT", "AST", "GGT", "ALKPHOS", "BILITOT"  No results found for: "PLT"    Assessment:  Dannie Bowman is a 69 y.o. male with the following diagnoses based on history, exam, and imaging:    Problem List Items Addressed This Visit    None  Visit Diagnoses         Tear of right acetabular labrum, subsequent encounter    -  Primary      Chronic right hip pain          Primary osteoarthritis of right hip                        This is a pleasant 69 y.o. gentleman presenting with:     - Chronic right hip pain radiating down to the right knee: Suspect primarily due to GTBursitis and having some ITB syndrome with weakness in pelvis/hips   - " Minimal findings on hip x-ray, outside of degenerative changes of spine.   - Right hip MRI w/ mild distal right gluteal tendinopathy and nondisplaced tear of anterosuperior labrum, no findings of AVN.  - Patient did have more radiating pain down the right leg and (+) SLR on the right indicating possible radiculopathy causing referred pain and weakness.  - This improved w/ JOEL  - Lumbar MRI with arthritic changes which are most significant at the L4-5 level, and this creates moderate canal stenosis and lateral recess stenosis with moderate to severe bilateral foraminal narrowing at L4-5.  There is an effusion of the right L4-5 facet with posteriorly extending synovial cyst.   - Patient with Lumbosacral radiculopathy/radicular pain due to stenosis at L4-5, .. The pain is severe enough to greatly impact quality of life &/or function as evidenced on the patients PEG score and NRS.     - Pain persists despite > 4 weeks of conservative treatment.   - Neck pain: (+) bridges, brisk reflexes on exam.   - MRI with arthritic changes which creates severe canal stenosis at multiple levels, most severe at C5-6 with gliosis at this level.    - S/p cervical decompression  - Comorbidities: HTN. H/o EtOH abuse. On ASA for primary prevention     Treatment Plan:   - PT/OT/HEP: Cont PT to work on strength and gait. Discussed benefits of exercise for pain.   - Procedures: Schedule diagnostic & potentially therapeutic right hip joint corticosteroid injection under fluoroscopic guidance with local sedation. (CPT Codes 26128 + 31149). The patient is not allergic to iodinated contrast. Patient does not need to hold ASA as procedure is lower risk for bleeding complications than complications from holding medication.  - Can repeat right L4 & right L5 PRN.    - Medications:    - DC baclofen as just causing sedation  - Imaging: Reviewed.  - Labs: None.         Follow Up: RTC 2-3 weeks after procedure or sooner PRN.        Daphne Call  MD  Interventional Pain Medicine

## 2025-02-28 ENCOUNTER — RESULTS FOLLOW-UP (OUTPATIENT)
Dept: PAIN MEDICINE | Facility: CLINIC | Age: 70
End: 2025-02-28

## 2025-02-28 ENCOUNTER — OUTSIDE PLACE OF SERVICE (OUTPATIENT)
Dept: PAIN MEDICINE | Facility: CLINIC | Age: 70
End: 2025-02-28

## 2025-03-14 ENCOUNTER — OFFICE VISIT (OUTPATIENT)
Dept: PAIN MEDICINE | Facility: CLINIC | Age: 70
End: 2025-03-14
Payer: COMMERCIAL

## 2025-03-14 VITALS
SYSTOLIC BLOOD PRESSURE: 145 MMHG | OXYGEN SATURATION: 96 % | DIASTOLIC BLOOD PRESSURE: 73 MMHG | WEIGHT: 173 LBS | HEART RATE: 52 BPM | HEIGHT: 65 IN | BODY MASS INDEX: 28.82 KG/M2

## 2025-03-14 DIAGNOSIS — M16.11 PRIMARY OSTEOARTHRITIS OF RIGHT HIP: ICD-10-CM

## 2025-03-14 DIAGNOSIS — M25.551 PAIN IN JOINT OF RIGHT HIP: ICD-10-CM

## 2025-03-14 DIAGNOSIS — G89.29 CHRONIC RIGHT HIP PAIN: ICD-10-CM

## 2025-03-14 DIAGNOSIS — M25.551 CHRONIC RIGHT HIP PAIN: ICD-10-CM

## 2025-03-14 DIAGNOSIS — S73.191D TEAR OF RIGHT ACETABULAR LABRUM, SUBSEQUENT ENCOUNTER: Primary | ICD-10-CM

## 2025-03-14 PROCEDURE — 99214 OFFICE O/P EST MOD 30 MIN: CPT | Mod: S$PBB,,, | Performed by: PHYSICIAN ASSISTANT

## 2025-03-14 PROCEDURE — 3078F DIAST BP <80 MM HG: CPT | Mod: CPTII,,, | Performed by: PHYSICIAN ASSISTANT

## 2025-03-14 PROCEDURE — 1125F AMNT PAIN NOTED PAIN PRSNT: CPT | Mod: CPTII,,, | Performed by: PHYSICIAN ASSISTANT

## 2025-03-14 PROCEDURE — 3008F BODY MASS INDEX DOCD: CPT | Mod: CPTII,,, | Performed by: PHYSICIAN ASSISTANT

## 2025-03-14 PROCEDURE — 3077F SYST BP >= 140 MM HG: CPT | Mod: CPTII,,, | Performed by: PHYSICIAN ASSISTANT

## 2025-03-14 PROCEDURE — 1159F MED LIST DOCD IN RCRD: CPT | Mod: CPTII,,, | Performed by: PHYSICIAN ASSISTANT

## 2025-03-14 PROCEDURE — 4010F ACE/ARB THERAPY RXD/TAKEN: CPT | Mod: CPTII,,, | Performed by: PHYSICIAN ASSISTANT

## 2025-03-14 NOTE — PROGRESS NOTES
[facility]        Chief Complaint:   Chief Complaint   Patient presents with    Low-back Pain       History of Present Illness: Dannie Bowman is a 69 y.o. male referred by Dr. Eleanor Roldan for right hip pain.      Onset: 1 year, insidious  Location: right lateral hip  Radiation: right lateral thigh to the knee  Timing: intermittent  Quality: Aching, Tight, Deep, and Sharp  Exacerbating Factors: walking, standing, and lifting  Alleviating Factors: lying down, sitting, repositioning , and medications  Associated Symptoms: He feels weakness in the right leg. He gets occasional numbness in the leg if sitting for a long time. He has a history of skin cancer. Pain will wake him at night. He denies night fever/night sweats, urinary incontinence/change in function, bowel incontinence/change in function, and unexplained weight loss    Patient has failed > 6 weeks of conservative treatment including: Activity modification (avoiding exacerbating factors), physical therapy, and oral medications. He is using a quad cane. He has tried ibuprofen. He has tried CBD oil.     Severity: Currently: 3/10   Typical Range: 2-8/10     Exacerbation: 8/10     Functional Limitations: Moderate to severe pain is limiting Sleep, Work, and Recreation.    Employment Status: Professor of Math at [employer]    P = 4  E = 3  G = 5  Baseline PEG Score = 4    Previous Interventions:  - 2/28/25: Right hip CSI w/ 75% relief   - 11/13/24: Right GTB CSI w/ limited relief   - 10/29/24: Right L4 & L5 TF JOEL w/ 100% relief of right leg pain  - 7/19/24: Right GTB w/ US guidance provided 0% relief.    Previous Therapies:  PT/OT: yes   Relevant Surgery: C4-6, partial C7 laminectomy 8/19/24   Previous Medications:   - NSAIDS: ibuprofen  - Muscle Relaxants:    - TCAs:   - SNRIs:   - Topicals: CBD oil  - Anticonvulsants:    - Opioids:     Interval History (08/14/2024):  Dannie Bowman returns today for follow up.  At the last clinic visit, continue HEP,  Performed right GTB CSI w/ US guidance, Ordered x-ray of  hip & C-spine. Ordered MRI of right hip and C-spine given UMN signs on exam.     Right GTB CSI w/ US guidance provided 0% relief.    Currently, the hip pain is stable. Continues to have pain to lateral hip, into lateral thigh and into knee. Denies new weakness or numbness.     He did have MRI of cervical spine, which revealed severe canal stenosis, worst at C5-6. He denies neck pain, but does report dropping items repetitively, difficulty with buttons and changes in gait.  Denies changes in bowel or bladder function. Denies recent fevers or infections. Denies unexplained weight loss. He is scheduled to see Dr. Leone tomorrow for surgical evaluation of neck..       Current Pain Scales:  Current: 4/10              Typical Range: 3-8/10     Interval History (10/09/2024):  Dannie Bowman returns today for follow up.  At the last clinic visit, ordered imaging,  Plan for likely lumbar TF JOEL pending advanced imaging.     Currently, the right lower back is stable. Pain continues to be localized to right lower back with radiation into right hip, right lateral thigh, not past the knee. Denies any changes in bowel or bladder function. Denies any new weakness or new numbness since the most recent visit. Denies any fevers or recent infections/antibiotics.     Since prior visit, he underwent cervical decompression (C4-6, partial C7 laminectomy) with Dr. Leone. He is approximately 7 weeks out and is doing well.  He just completed 6 weeks of PT, which provided minimal relief.     Current Pain Scales:  Current: 4/10              Typical Range: 3-7/10       Interval History (02/18/2025):  Dannie Bowman returns today for follow up.  At the last clinic visit, referred to physical therapy Refer back to PT to work on strength and gait. Performed right GTB CSI w/ US guidance.     Physical therapy provided 20% relief. Right GTB CSI w/ minimal relief.     Currently, the  right hip pain  is stable.      Current Pain Scales:  Current: 3/10              Average: 4/10  Least-Worst: 3-6/10    Current PEG Score: 4       Interval History (03/14/2025):  Dannie Bowman returns today for follow up.  At the last clinic visit, Scheduled diagnostic & potentially therapeutic right hip joint corticosteroid injection under fluoroscopic guidance with local sedation.     Right hip joint corticosteroid injection provided 75% relief.     Currently, right hip pain is improved. Reports still with some having some right hip and limitations with walking/weight bearing, but this is improved from prior.     He continues with PT, which is helping some with strength and gait.     Current Pain Scales:  Current: 2/10              Average: 5/10  Least-Worst: 1-5/10    Current PEG Score: 4.33       Opioid Risk Score         Value Time User    Opioid Risk Score  8 8/14/2024  2:37 PM Stanley Gallo MA             Current Pain Medications:  Ibuprofen   Baclofen 5mg    Blood Thinners: None    Full Medication List:    Current Outpatient Medications:     aspirin 81 MG Chew, Take 81 mg by mouth once daily., Disp: , Rfl:     finasteride (PROSCAR) 5 mg tablet, 5 mg., Disp: , Rfl:     hydroCHLOROthiazide (HYDRODIURIL) 12.5 MG Tab, Take 12.5 mg by mouth., Disp: , Rfl:     irbesartan (AVAPRO) 300 MG tablet, Take 300 mg by mouth., Disp: , Rfl:     nebivoloL (BYSTOLIC) 10 MG Tab, 10 mg., Disp: , Rfl:     rosuvastatin (CRESTOR) 10 MG tablet, Take 10 mg by mouth., Disp: , Rfl:     tadalafiL (CIALIS) 20 MG Tab, TAKE 1 TABLET BY MOUTH AS DIRECTED (NOT COVERED), Disp: , Rfl:     tamsulosin (FLOMAX) 0.4 mg Cap, Take 0.4 mg by mouth., Disp: , Rfl:      Review of Systems: See HPI    Allergies:  Patient has no known allergies.     Medical History:   has a past medical history of ADHD (attention deficit hyperactivity disorder), History of alcohol abuse, Hyperlipidemia, Hypertension, Impotence of organic origin, Joint pain, and Unspecified sensorineural  "hearing loss.    Surgical History:   has a past surgical history that includes Hernia repair (Bilateral) and Bone graft (Right).    Family History:  family history includes Alcohol abuse in his mother and sister; Cancer in his father and mother; Diabetes in his father and mother; Hypertension in his father and mother.    Social History:   reports that he has quit smoking. His smoking use included cigarettes. He has never used smokeless tobacco. He reports that he does not drink alcohol and does not use drugs.    Physical Exam:  BP (!) 145/73   Pulse (!) 52   Ht 5' 5" (1.651 m)   Wt 78.5 kg (173 lb)   SpO2 96%   BMI 28.79 kg/m²   GEN: No acute distress. Calm, comfortable  HENT: Normocephalic, atraumatic, moist mucous membranes  EYE: Anicteric sclera, non-injected.   CV: Non-diaphoretic. Regular Rate. Radial Pulses 2+.  RESP: Breathing comfortably. Chest expansion symmetric.  EXT: No clubbing, cyanosis. Peripheral edema in legs/feet  SKIN: Warm, & dry to palpation. No visible rashes or lesions of exposed skin.   PSYCH: Pleasant mood and appropriate affect. Recent and remote memory intact.   GAIT: Mod Independent w/ quad cane, antalgic ambulation not bearing weight on the right  Lumbar Spine Exam:       Inspection: No erythema, bruising.       Palpation:   - (-) TTP of lumbar paraspinals bilaterally.  - (-) TTP of sacroiliac joint bilaterally.     ROM: (+) Limited in flexion. (-) limited in extension, (-) limitation in lateral bending bilateral.    Directional Preference: None      Provocative Maneuvers:  (-) Facet loading bilaterally  (+) Straight Leg Raise on the right   (+) SHANTE on the right w/ pain on lateral hip primarily  Hip Exam:      Inspection: No gross deformity or apparent leg length discrepancy      Palpation: (+) TTP to greater trochanteric bursa(s) on right.       ROM: (slight) limitation in internal rotation b/l, limitation in external rotation bilateral  Neurologic Exam:     Alert. Speech is " fluent and appropriate.     Strength:  4/5 in b/l hip flexion, otherwise 5/5 throughout bilateral lower extremities     Sensation:  Grossly intact to light touch in bilateral lower extremities     Reflexes: 3+ in b/l patella, achilles     Tone: No abnormality appreciated in bilateral lower extremities     No Clonus     Downgoing toes on plantar stimulation     (+) Andrade bilaterally                Imaging:  - MRI Lumbar Spine 9/25/24:  Lumbar vertebral body heights and alignment are maintained. There is no suspicious marrow signal. The conus and cauda equina are unremarkable. There is colonic diverticulosis.        T12-L1:No spinal stenosis. Disc desiccation and diffuse disc bulge. Mild facet DJD with moderate foraminal narrowing.        L1-2: No spinal stenosis. Mild facet DJD with mild bilateral foraminal narrowing.        L2-3:  No spinal stenosis. Mild facet DJD with mild bilateral foraminal narrowing.        L3-4: Mild spinal stenosis. Disc desiccation and diffuse disc bulge. Mild facet DJD with mild bilateral foraminal narrowing.        L4-5: Disc desiccation and diffuse disc bulge. Moderate spinal stenosis and  lateral recess stenosis. Advanced facet DJD with moderate to severe bilateral foraminal narrowing. There is a right-sided facet joint effusion with a synovial cyst projecting posteriorly.        L5-S1: No spinal stenosis. Mild facet DJD without foraminal narrowing.        - MRI Cervical spine 8/12/24:  FINDINGS:   The visualized anatomy at the skull base is normal. There is no evidence of any cerebellar tonsillar ectopia.        No prevertebral edema is demonstrated. There is no evidence of any edema within the interspinous ligaments to suggest any strain or sprain of those ligaments.        There is abnormal signal in the cervical spinal cord C5-C6 disc level and extending inferiorly to the upper C6-C7 disc level, most likely related to gliosis there is a significant spinal stenosis at this level.         There is preservation of cervical vertebral body stature. No edema is noted  within the cervical vertebral bodies. There is  preservation of a normal cervical lordosis.        Degenerative changes of the cervical spine are as follows:        C2/C3:  Disc desiccation. Minimal anterior spondylosis. Broad-based posterior annular bulge with ligamentum flavum hypertrophy. Right posterior  facet arthropathy. Stenosis.        C3/C4:  Disc desiccation. There is an anterior outer annular fissure. Moderate disc space narrowing. Anterior and posterior spondylosis with bilateral uncinate hypertrophy and with a right uncovertebral osteophyte disc complex and probable posterior disc herniation. Moderate right and mild left posterior facet arthropathy. Ligamentum flavum and mild posterior facet hypertrophy. Spinal stenosis. Right foraminal origin stenosis.        C4/C5:  Disc desiccation with prominent disc space narrowing and mild retrolisthesis of C4 on C5. There is a posterior outer annular fissure. Anterior and posterior spondylosis with bilateral uncinate hypertrophy and with bilateral posterior facet and ligamentum flavum hypertrophy. There are  anterior and posterior osteophyte disc complexes. There is moderate to prominent spinal stenosis with bony bilateral foraminal stenosis, predominating on the right.        C5/C6:  Disc desiccation with prominent disc space narrowing. Bilateral uncinate hypertrophy with broad-based, posterior osteophyte disc complex. There is a left anterior outer annular fissure. Left posterior inner annular fissures are seen. There is bilateral posterior facet and ligamentum flavum  hypertrophy. Severe spinal stenosis with bilateral lateral recess and foraminal origin stenosis.        C6/C7:  Disc desiccation with moderate disc space narrowing. Anterior posterior spondylosis. Mild bilateral posterior facet arthropathy. There is  bilateral uncinate hypertrophy, left side greater than right.  "Broad-based posterior osteophyte disc complex. Prominent spinal stenosis with bilateral    lateral recess and foraminal stenosis, predominating on the left.        C7/T1:  Mild disc desiccation and mild disc space narrowing. Bilateral posterior facet hypertrophy. No stenosis.        - MRI Right hip 24:   No evidence of fracture or osteonecrosis. No suspicious marrow edema. No joint effusion. There is low-grade superior cartilage thinning right hip. Suspected nondisplaced tear of the anterosuperior labrum.        No soft tissue mass or collection. Mild distal right gluteal tendinopathy without evidence of high-grade tear. No evidence of iliopsoas or trochanteric bursitis.      - X-ray cervical spine 24:  Vertebral body heights maintained. Trace retrolisthesis C4 on C5. Multilevel degenerative disc height loss of the mid and lower cervical spine with osteophyte formation most prevalent at C3-4 and C4-5. Multilevel facet arthropathy present. No acute abnormality. Soft tissues unremarkable.     - X-ray lumbar spine 24:  Vertebral body heights maintained. No spondylolisthesis. L5-S1 degenerative disc height loss. Multilevel facet arthropathy. No acute osseous abnormality. Constipation.     - X-ray hips b/l 24:  No acute osseous abnormality. Hip joint spaces maintained. Degenerative findings of the lower lumbar spine noted. Soft tissues unremarkable.         Labs:  BMP  No results found for: "NA", "K", "CL", "CO2", "BUN", "CREATININE", "CALCIUM", "ANIONGAP", "EGFRNORACEVR"  No results found for: "ALT", "AST", "GGT", "ALKPHOS", "BILITOT"  No results found for: "PLT"    Assessment:  Dannie Bowman is a 69 y.o. male with the following diagnoses based on history, exam, and imagin. Tear of right acetabular labrum, subsequent encounter    2. Pain in joint of right hip    3. Primary osteoarthritis of right hip    4. Chronic right hip pain              This is a pleasant 69 y.o. gentleman presenting " with:     - Chronic right hip pain radiating down to the right knee: Suspect primarily due to GTBursitis and having some ITB syndrome with weakness in pelvis/hips   - Minimal findings on hip x-ray, outside of degenerative changes of spine.   - Right hip MRI w/ mild distal right gluteal tendinopathy and nondisplaced tear of anterosuperior labrum, no findings of AVN.  - Patient did have more radiating pain down the right leg and (+) SLR on the right indicating possible radiculopathy causing referred pain and weakness.  - This improved w/ JOEL  - Lumbar MRI with arthritic changes which are most significant at the L4-5 level, and this creates moderate canal stenosis and lateral recess stenosis with moderate to severe bilateral foraminal narrowing at L4-5.  There is an effusion of the right L4-5 facet with posteriorly extending synovial cyst.   - Neck pain: (+) bridges, brisk reflexes on exam.   - MRI with arthritic changes which creates severe canal stenosis at multiple levels, most severe at C5-6 with gliosis at this level.    - S/p cervical decompression  - Comorbidities: HTN. H/o EtOH abuse. On ASA for primary prevention     Treatment Plan:   - PT/OT/HEP: Cont PT to work on strength and gait. Discussed benefits of exercise for pain.   - Procedures: None at this time.  - Can repeat right L4 & right L5 PRN.    - Medications: No changes at this time.   - Imaging: Reviewed.  - Labs: None.   - Offered to refer to Orthopedic regarding right hip, patient declines at this time.      Follow Up: RTC 2-3 months or sooner PRN      Sumi Kurtz PA-C  [facility]ventional Pain Medicine

## 2025-05-09 ENCOUNTER — OFFICE VISIT (OUTPATIENT)
Dept: PAIN MEDICINE | Facility: CLINIC | Age: 70
End: 2025-05-09
Payer: COMMERCIAL

## 2025-05-09 VITALS
BODY MASS INDEX: 29.32 KG/M2 | OXYGEN SATURATION: 95 % | HEIGHT: 65 IN | SYSTOLIC BLOOD PRESSURE: 132 MMHG | HEART RATE: 54 BPM | DIASTOLIC BLOOD PRESSURE: 76 MMHG | WEIGHT: 176 LBS

## 2025-05-09 DIAGNOSIS — M25.551 PAIN IN JOINT OF RIGHT HIP: ICD-10-CM

## 2025-05-09 DIAGNOSIS — G95.9 CERVICAL MYELOPATHY: Primary | ICD-10-CM

## 2025-05-09 DIAGNOSIS — R29.898 WEAKNESS OF RIGHT LOWER EXTREMITY: ICD-10-CM

## 2025-05-09 DIAGNOSIS — M16.11 PRIMARY OSTEOARTHRITIS OF RIGHT HIP: ICD-10-CM

## 2025-05-09 DIAGNOSIS — M25.551 CHRONIC RIGHT HIP PAIN: ICD-10-CM

## 2025-05-09 DIAGNOSIS — G89.29 CHRONIC RIGHT HIP PAIN: ICD-10-CM

## 2025-05-09 DIAGNOSIS — S73.191D TEAR OF RIGHT ACETABULAR LABRUM, SUBSEQUENT ENCOUNTER: ICD-10-CM

## 2025-05-09 NOTE — PROGRESS NOTES
[facility]        Chief Complaint:   Chief Complaint   Patient presents with    Hip Pain       History of Present Illness: Dannie Bowman is a 69 y.o. male referred by Dr. Eleanor Roldan for right hip pain.      Onset: 1 year, insidious  Location: right lateral hip  Radiation: right lateral thigh to the knee  Timing: intermittent  Quality: Aching, Tight, Deep, and Sharp  Exacerbating Factors: walking, standing, and lifting  Alleviating Factors: lying down, sitting, repositioning , and medications  Associated Symptoms: He feels weakness in the right leg. He gets occasional numbness in the leg if sitting for a long time. He has a history of skin cancer. Pain will wake him at night. He denies night fever/night sweats, urinary incontinence/change in function, bowel incontinence/change in function, and unexplained weight loss    Patient has failed > 6 weeks of conservative treatment including: Activity modification (avoiding exacerbating factors), physical therapy, and oral medications. He is using a quad cane. He has tried ibuprofen. He has tried CBD oil.     Severity: Currently: 3/10   Typical Range: 2-8/10     Exacerbation: 8/10     Functional Limitations: Moderate to severe pain is limiting Sleep, Work, and Recreation.    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    Previous Therapies:  PT/OT: yes   Relevant Surgery: C4-6, partial C7 laminectomy 8/19/24   Previous Medications:   - NSAIDS: ibuprofen  - Muscle Relaxants:    - TCAs:   - SNRIs:   - Topicals: CBD oil  - Anticonvulsants:    - Opioids:      Interval History (08/14/2024):  He did have MRI of cervical spine, which revealed severe canal stenosis, worst at C5-6 with gliosis at this level. He denies neck pain, but does report dropping items repetitively, difficulty with buttons and changes in gait. Denies changes in bowel or bladder function. Denies recent fevers or infections. Denies unexplained weight loss. He is scheduled to see Dr. Leone tomorrow for surgical evaluation of neck..       Interval History (10/09/2024):  Dannie Bowman returns today for follow up.  At the last clinic visit, ordered imaging,  Plan for likely lumbar TF JOEL pending advanced imaging.     Currently, the right lower back is stable. Pain continues to be localized to right lower back with radiation into right hip, right lateral thigh, not past the knee. Denies any changes in bowel or bladder function. Denies any new weakness or new numbness since the most recent visit. Denies any fevers or recent infections/antibiotics.     Since prior visit, he underwent cervical decompression (C4-6, partial C7 laminectomy) with Dr. Leone. He is approximately 7 weeks out and is doing well.  He just completed 6 weeks of PT, which provided minimal relief.     Current Pain Scales:  Current: 4/10              Typical Range: 3-7/10       Interval History (03/14/2025):  Dannie Bowman returns today for follow up.  At the last clinic visit, Scheduled diagnostic & potentially therapeutic right hip joint corticosteroid injection under fluoroscopic guidance with local sedation.     Right hip joint corticosteroid injection provided 75% relief.     Currently, right hip pain is improved. Reports still with some having some right hip and limitations with walking/weight bearing, but this is improved from prior.     He continues with PT, which is helping some with strength and gait.     Current Pain Scales:  Current: 2/10              Average: 5/10  Least-Worst: 1-5/10  Current PEG Score: 4.33     Interval  History (05/09/2025):  Dannie Bowman returns today for follow up.  At the last clinic visit, Cont PT to work on strength and gait.     Currently, the right hip pain is improved and not causing pain or limitation at this time.     He continues to have difficulty with gait and balance, states PT has provided some, but limited relief with this.  Denies any changes in bowel or bladder function. Denies any new weakness or new numbness since the most recent visit. Denies any fevers or recent infections/antibiotics. Denies any unexplained weight loss.     Current Pain Scales:  Current: 3/10              Average: 3/10  Least-Worst: 1-4/10           5/9/2025    11:13 AM   Last 3 PDI Scores   Pain Disability Index (PDI) 20            Current Pain Medications:  Ibuprofen   Baclofen 5mg    Blood Thinners: None    Full Medication List:    Current Outpatient Medications:     aspirin 81 MG Chew, Take 81 mg by mouth once daily., Disp: , Rfl:     finasteride (PROSCAR) 5 mg tablet, 5 mg., Disp: , Rfl:     hydroCHLOROthiazide (HYDRODIURIL) 12.5 MG Tab, Take 12.5 mg by mouth., Disp: , Rfl:     irbesartan (AVAPRO) 300 MG tablet, Take 300 mg by mouth., Disp: , Rfl:     nebivoloL (BYSTOLIC) 10 MG Tab, 10 mg., Disp: , Rfl:     rosuvastatin (CRESTOR) 10 MG tablet, Take 10 mg by mouth., Disp: , Rfl:     tadalafiL (CIALIS) 20 MG Tab, TAKE 1 TABLET BY MOUTH AS DIRECTED (NOT COVERED), Disp: , Rfl:     tamsulosin (FLOMAX) 0.4 mg Cap, Take 0.4 mg by mouth., Disp: , Rfl:      Review of Systems: See HPI    Allergies:  Patient has no known allergies.     Medical History:   has a past medical history of ADHD (attention deficit hyperactivity disorder), History of alcohol abuse, Hyperlipidemia, Hypertension, Impotence of organic origin, Joint pain, and Unspecified sensorineural hearing loss.    Surgical History:   has a past surgical history that includes Hernia repair (Bilateral) and Bone graft (Right).    Family History:  family history includes  "Alcohol abuse in his mother and sister; Cancer in his father and mother; Diabetes in his father and mother; Hypertension in his father and mother.    Social History:   reports that he has quit smoking. His smoking use included cigarettes. He has never used smokeless tobacco. He reports that he does not drink alcohol and does not use drugs.    Physical Exam:  /76 (Patient Position: Sitting)   Pulse (!) 54   Ht 5' 5" (1.651 m)   Wt 79.8 kg (176 lb)   SpO2 95%   BMI 29.29 kg/m²   GEN: No acute distress. Calm, comfortable  HENT: Normocephalic, atraumatic, moist mucous membranes  EYE: Anicteric sclera, non-injected.   CV: Non-diaphoretic. Regular Rate. Radial Pulses 2+.  RESP: Breathing comfortably. Chest expansion symmetric.  EXT: No clubbing, cyanosis. Peripheral edema in legs/feet  SKIN: Warm, & dry to palpation. No visible rashes or lesions of exposed skin.   PSYCH: Pleasant mood and appropriate affect. Recent and remote memory intact.   GAIT: Mod Independent w/ quad cane, antalgic ambulation not bearing weight on the right  Lumbar Spine Exam:       Inspection: No erythema, bruising.       Palpation:   - (-) TTP of lumbar paraspinals bilaterally.  - (-) TTP of sacroiliac joint bilaterally.     ROM: (+) Limited in flexion. (-) limited in extension, (-) limitation in lateral bending bilateral.    Directional Preference: None      Provocative Maneuvers:  (-) Facet loading bilaterally  (+) Straight Leg Raise on the right   (+) SHANTE on the right w/ pain on lateral hip primarily  Hip Exam:      Inspection: No gross deformity or apparent leg length discrepancy      Palpation: (+) TTP to greater trochanteric bursa(s) on right.       ROM: (slight) limitation in internal rotation b/l, limitation in external rotation bilateral  Neurologic Exam:     Alert. Speech is fluent and appropriate.     Strength:  4/5 in b/l hip flexion, otherwise 5/5 throughout bilateral lower extremities     Sensation:  Grossly intact to " light touch in bilateral lower extremities     Reflexes: 3+ in b/l patella, achilles     Tone: No abnormality appreciated in bilateral lower extremities     No Clonus     Downgoing toes on plantar stimulation     (+) Andrade bilaterally                Imaging:  - MRI Lumbar Spine 9/25/24:  Lumbar vertebral body heights and alignment are maintained. There is no suspicious marrow signal. The conus and cauda equina are unremarkable. There is colonic diverticulosis.        T12-L1:No spinal stenosis. Disc desiccation and diffuse disc bulge. Mild facet DJD with moderate foraminal narrowing.        L1-2: No spinal stenosis. Mild facet DJD with mild bilateral foraminal narrowing.        L2-3:  No spinal stenosis. Mild facet DJD with mild bilateral foraminal narrowing.        L3-4: Mild spinal stenosis. Disc desiccation and diffuse disc bulge. Mild facet DJD with mild bilateral foraminal narrowing.        L4-5: Disc desiccation and diffuse disc bulge. Moderate spinal stenosis and  lateral recess stenosis. Advanced facet DJD with moderate to severe bilateral foraminal narrowing. There is a right-sided facet joint effusion with a synovial cyst projecting posteriorly.        L5-S1: No spinal stenosis. Mild facet DJD without foraminal narrowing.        - MRI Cervical spine 8/12/24:  FINDINGS:   The visualized anatomy at the skull base is normal. There is no evidence of any cerebellar tonsillar ectopia.        No prevertebral edema is demonstrated. There is no evidence of any edema within the interspinous ligaments to suggest any strain or sprain of those ligaments.        There is abnormal signal in the cervical spinal cord C5-C6 disc level and extending inferiorly to the upper C6-C7 disc level, most likely related to gliosis there is a significant spinal stenosis at this level.        There is preservation of cervical vertebral body stature. No edema is noted  within the cervical vertebral bodies. There is  preservation of a  normal cervical lordosis.        Degenerative changes of the cervical spine are as follows:        C2/C3:  Disc desiccation. Minimal anterior spondylosis. Broad-based posterior annular bulge with ligamentum flavum hypertrophy. Right posterior  facet arthropathy. Stenosis.        C3/C4:  Disc desiccation. There is an anterior outer annular fissure. Moderate disc space narrowing. Anterior and posterior spondylosis with bilateral uncinate hypertrophy and with a right uncovertebral osteophyte disc complex and probable posterior disc herniation. Moderate right and mild left posterior facet arthropathy. Ligamentum flavum and mild posterior facet hypertrophy. Spinal stenosis. Right foraminal origin stenosis.        C4/C5:  Disc desiccation with prominent disc space narrowing and mild retrolisthesis of C4 on C5. There is a posterior outer annular fissure. Anterior and posterior spondylosis with bilateral uncinate hypertrophy and with bilateral posterior facet and ligamentum flavum hypertrophy. There are  anterior and posterior osteophyte disc complexes. There is moderate to prominent spinal stenosis with bony bilateral foraminal stenosis, predominating on the right.        C5/C6:  Disc desiccation with prominent disc space narrowing. Bilateral uncinate hypertrophy with broad-based, posterior osteophyte disc complex. There is a left anterior outer annular fissure. Left posterior inner annular fissures are seen. There is bilateral posterior facet and ligamentum flavum  hypertrophy. Severe spinal stenosis with bilateral lateral recess and foraminal origin stenosis.        C6/C7:  Disc desiccation with moderate disc space narrowing. Anterior posterior spondylosis. Mild bilateral posterior facet arthropathy. There is  bilateral uncinate hypertrophy, left side greater than right. Broad-based posterior osteophyte disc complex. Prominent spinal stenosis with bilateral    lateral recess and foraminal stenosis, predominating on the  "left.        C7/T1:  Mild disc desiccation and mild disc space narrowing. Bilateral posterior facet hypertrophy. No stenosis.        - MRI Right hip 24:   No evidence of fracture or osteonecrosis. No suspicious marrow edema. No joint effusion. There is low-grade superior cartilage thinning right hip. Suspected nondisplaced tear of the anterosuperior labrum.        No soft tissue mass or collection. Mild distal right gluteal tendinopathy without evidence of high-grade tear. No evidence of iliopsoas or trochanteric bursitis.      - X-ray cervical spine 24:  Vertebral body heights maintained. Trace retrolisthesis C4 on C5. Multilevel degenerative disc height loss of the mid and lower cervical spine with osteophyte formation most prevalent at C3-4 and C4-5. Multilevel facet arthropathy present. No acute abnormality. Soft tissues unremarkable.     - X-ray lumbar spine 24:  Vertebral body heights maintained. No spondylolisthesis. L5-S1 degenerative disc height loss. Multilevel facet arthropathy. No acute osseous abnormality. Constipation.     - X-ray hips b/l 24:  No acute osseous abnormality. Hip joint spaces maintained. Degenerative findings of the lower lumbar spine noted. Soft tissues unremarkable.         Labs:  BMP  No results found for: "NA", "K", "CL", "CO2", "BUN", "CREATININE", "CALCIUM", "ANIONGAP", "EGFRNORACEVR"  No results found for: "ALT", "AST", "GGT", "ALKPHOS", "BILITOT"  No results found for: "PLT"    Assessment:  Dannie Bowman is a 69 y.o. male with the following diagnoses based on history, exam, and imagin. Cervical myelopathy    2. Pain in joint of right hip    3. Tear of right acetabular labrum, subsequent encounter    4. Primary osteoarthritis of right hip    5. Chronic right hip pain    6. Weakness of right lower extremity                This is a pleasant 69 y.o. gentleman presenting with:     - Chronic right hip pain radiating down to the right knee: Suspect primarily " due to GTBursitis and having some ITB syndrome with weakness in pelvis/hips   - Minimal findings on hip x-ray, outside of degenerative changes of spine.   - Right hip MRI w/ mild distal right gluteal tendinopathy and nondisplaced tear of anterosuperior labrum, no findings of AVN.  - Patient did have more radiating pain down the right leg and (+) SLR on the right indicating possible radiculopathy causing referred pain and weakness.  - This improved w/ JOEL  - Lumbar MRI with arthritic changes which are most significant at the L4-5 level, and this creates moderate canal stenosis and lateral recess stenosis with moderate to severe bilateral foraminal narrowing at L4-5.  There is an effusion of the right L4-5 facet with posteriorly extending synovial cyst.   - Neck pain: (+) bridges, brisk reflexes on exam.   - MRI with arthritic changes which creates severe canal stenosis at multiple levels, most severe at C5-6 with gliosis at this level.    - S/p cervical decompression  - Comorbidities: HTN. H/o EtOH abuse. On ASA for primary prevention     Treatment Plan:   - PT/OT/HEP: Cont PT to work on strength and gait. Discussed benefits of exercise for pain.   - Procedures: None at this time.   - Can repeat right hip CSI in the future PRN  - Can repeat right L4 & right L5 PRN.    - Medications: No changes at this time.   - Imaging: Reviewed.  - Labs: None.   - Consider referral to Ortho for right hip pain, if no continued relief in pain.      Follow Up: RTC PRN      Sumi Kurtz PA-C  [facility]